# Patient Record
Sex: MALE | Race: WHITE
[De-identification: names, ages, dates, MRNs, and addresses within clinical notes are randomized per-mention and may not be internally consistent; named-entity substitution may affect disease eponyms.]

---

## 2017-03-22 ENCOUNTER — HOSPITAL ENCOUNTER (OUTPATIENT)
Dept: HOSPITAL 31 - C.ENDO | Age: 77
Discharge: HOME | End: 2017-03-22
Attending: INTERNAL MEDICINE
Payer: MEDICARE

## 2017-03-22 VITALS — TEMPERATURE: 97.4 F

## 2017-03-22 VITALS — BODY MASS INDEX: 30.7 KG/M2

## 2017-03-22 VITALS — SYSTOLIC BLOOD PRESSURE: 138 MMHG | HEART RATE: 97 BPM | RESPIRATION RATE: 13 BRPM | DIASTOLIC BLOOD PRESSURE: 79 MMHG

## 2017-03-22 VITALS — OXYGEN SATURATION: 100 %

## 2017-03-22 DIAGNOSIS — K64.8: ICD-10-CM

## 2017-03-22 DIAGNOSIS — K59.00: Primary | ICD-10-CM

## 2017-03-22 PROCEDURE — 45378 DIAGNOSTIC COLONOSCOPY: CPT

## 2017-03-22 PROCEDURE — 82948 REAGENT STRIP/BLOOD GLUCOSE: CPT

## 2017-10-18 ENCOUNTER — HOSPITAL ENCOUNTER (INPATIENT)
Dept: HOSPITAL 31 - C.ER | Age: 77
LOS: 6 days | Discharge: NURSING, REHAB OR LONG-TERM CARE | DRG: 377 | End: 2017-10-24
Attending: FAMILY MEDICINE | Admitting: FAMILY MEDICINE
Payer: MEDICARE

## 2017-10-18 VITALS — BODY MASS INDEX: 30.7 KG/M2

## 2017-10-18 DIAGNOSIS — I25.10: ICD-10-CM

## 2017-10-18 DIAGNOSIS — I50.9: ICD-10-CM

## 2017-10-18 DIAGNOSIS — D62: ICD-10-CM

## 2017-10-18 DIAGNOSIS — E11.9: ICD-10-CM

## 2017-10-18 DIAGNOSIS — K59.09: ICD-10-CM

## 2017-10-18 DIAGNOSIS — E78.5: ICD-10-CM

## 2017-10-18 DIAGNOSIS — Z87.891: ICD-10-CM

## 2017-10-18 DIAGNOSIS — G47.30: ICD-10-CM

## 2017-10-18 DIAGNOSIS — N40.0: ICD-10-CM

## 2017-10-18 DIAGNOSIS — I48.91: ICD-10-CM

## 2017-10-18 DIAGNOSIS — J18.9: ICD-10-CM

## 2017-10-18 DIAGNOSIS — K64.8: ICD-10-CM

## 2017-10-18 DIAGNOSIS — K55.21: Primary | ICD-10-CM

## 2017-10-18 DIAGNOSIS — M81.0: ICD-10-CM

## 2017-10-18 DIAGNOSIS — J44.0: ICD-10-CM

## 2017-10-18 DIAGNOSIS — I11.0: ICD-10-CM

## 2017-10-18 DIAGNOSIS — D12.3: ICD-10-CM

## 2017-10-18 DIAGNOSIS — K29.80: ICD-10-CM

## 2017-10-18 DIAGNOSIS — E78.00: ICD-10-CM

## 2017-10-18 DIAGNOSIS — F41.9: ICD-10-CM

## 2017-10-18 DIAGNOSIS — F31.9: ICD-10-CM

## 2017-10-18 DIAGNOSIS — K29.60: ICD-10-CM

## 2017-10-18 LAB
ALBUMIN/GLOB SERPL: 1 {RATIO} (ref 1–2.1)
ALP SERPL-CCNC: 49 U/L (ref 38–126)
ALT SERPL-CCNC: 78 U/L (ref 21–72)
APTT BLD: 27 SECONDS (ref 21–34)
AST SERPL-CCNC: 46 U/L (ref 17–59)
BASE EXCESS BLDV CALC-SCNC: 3.2 MMOL/L (ref 0–2)
BASOPHILS # BLD AUTO: 0 K/UL (ref 0–0.2)
BASOPHILS NFR BLD: 0.6 % (ref 0–2)
BILIRUB SERPL-MCNC: 0.7 MG/DL (ref 0.2–1.3)
BILIRUB UR-MCNC: NEGATIVE MG/DL
BUN SERPL-MCNC: 34 MG/DL (ref 9–20)
CALCIUM SERPL-MCNC: 8.4 MG/DL (ref 8.6–10.4)
CHLORIDE SERPL-SCNC: 104 MMOL/L (ref 98–107)
CO2 SERPL-SCNC: 24 MMOL/L (ref 22–30)
EOSINOPHIL # BLD AUTO: 0.1 K/UL (ref 0–0.7)
EOSINOPHIL NFR BLD: 1.8 % (ref 0–4)
ERYTHROCYTE [DISTWIDTH] IN BLOOD BY AUTOMATED COUNT: 15.7 % (ref 11.5–14.5)
GLOBULIN SER-MCNC: 2.8 GM/DL (ref 2.2–3.9)
GLUCOSE SERPL-MCNC: 88 MG/DL (ref 75–110)
GLUCOSE UR STRIP-MCNC: NORMAL MG/DL
HCT VFR BLD CALC: 22.2 % (ref 35–51)
INR PPP: 1.2
KETONES UR STRIP-MCNC: NEGATIVE MG/DL
LEUKOCYTE ESTERASE UR-ACNC: (no result) LEU/UL
LYMPHOCYTES # BLD AUTO: 1 K/UL (ref 1–4.3)
LYMPHOCYTES NFR BLD AUTO: 13.7 % (ref 20–40)
MCH RBC QN AUTO: 31.7 PG (ref 27–31)
MCHC RBC AUTO-ENTMCNC: 33 G/DL (ref 33–37)
MCV RBC AUTO: 96 FL (ref 80–94)
MONOCYTES # BLD: 0.4 K/UL (ref 0–0.8)
MONOCYTES NFR BLD: 5.3 % (ref 0–10)
NRBC BLD AUTO-RTO: 0.7 % (ref 0–2)
PCO2 BLDV: 42 MMHG (ref 40–60)
PH BLDV: 7.43 [PH] (ref 7.32–7.43)
PH UR STRIP: 5 [PH] (ref 5–8)
PLATELET # BLD: 115 K/UL (ref 130–400)
PMV BLD AUTO: 13.3 FL (ref 7.2–11.7)
POTASSIUM SERPL-SCNC: 4.4 MMOL/L (ref 3.6–5.2)
PROT SERPL-MCNC: 5.6 G/DL (ref 6.3–8.3)
PROT UR STRIP-MCNC: NEGATIVE MG/DL
RBC # UR STRIP: NEGATIVE /UL
RBC #/AREA URNS HPF: < 1 /HPF (ref 0–3)
SODIUM SERPL-SCNC: 137 MMOL/L (ref 132–148)
SP GR UR STRIP: 1.03 (ref 1–1.03)
UROBILINOGEN UR-MCNC: 2 MG/DL (ref 0.2–1)
WBC # BLD AUTO: 7.6 K/UL (ref 4.8–10.8)
WBC #/AREA URNS HPF: 3 /HPF (ref 0–5)

## 2017-10-18 PROCEDURE — 30233N1 TRANSFUSION OF NONAUTOLOGOUS RED BLOOD CELLS INTO PERIPHERAL VEIN, PERCUTANEOUS APPROACH: ICD-10-PCS | Performed by: FAMILY MEDICINE

## 2017-10-18 NOTE — CP.PCM.HP
<Nancy JohnsonJarad - Last Filed: 10/18/17 23:12>





History of Present Illness





- History of Present Illness


History of Present Illness: 


Patient is a 77 years old male with a past medical history of a fib, HTN, CAD, 

CHF, HLD, who presents to the ED from [facility] with complaints of 

weakness and a low hemoglobin. Patient was sent in by Dr. Claros for a 

hemoglobin of 7.3, which dropped from a hgb of 9.2 two days prior. Patient 

reports he has been feeling weak and lethargic for approximately 1 week. He 

says he cannot walk because he feels so weak. Patient also reports feeling dizzy

, has shortness of breath that worsens when laying down, abdominal pain for the 

last 3-4 weeks that worsens with constipation, chronic constipation and black 

stool. He reports he becomes constipated for days, takes milk of magnesia, then 

has diarrhea, and continues that cycles. He has not had a bowel movement in 2 

days. Patient reports he was recently treated for bilateral leg swelling with 

water pills about 1 month ago, and since then has lost approximately 40lbs. 

Patient reports he has been bedridden for the past few weeks due to inability 

to walk. Currently, the patient denies having chest pain, palpitations, cough, 

nausea, vomiting, fevers, chills, headaches, and leg pain.





PMD: Dr. Claros


PMHx: patient denies, but as per EMR- a fib, HTN, CAD, CHF, HLD, depression 

bipolar disorder, osteoporisis, COPD


SurgHx: denies


FamHx: Father-eye cancer, Sisters- Breast cancer


SocHx: Former tobacco user, quit 1 month ago ([id number]); former social drinker

, denies drug use; LIves are [facility].


Allergies: NKDA


Medications: See EMR for extensive list.





Present on Admission





- Present on Admission


Any Indicators Present on Admission: No





Review of Systems





- Constitutional


Constitutional: Fatigue, Lethargy, Weight Loss, Weakness.  absent: Chills, Fever

, Headache





- EENT


Eyes: absent: Change in Vision


Ears: Dizziness





- Cardiovascular


Cardiovascular: Dyspnea, Lightheadedness.  absent: Chest Pain, Irregular Heart 

Rhythm, Leg Edema, Palpitations, Rapid Heart Rate





- Respiratory


Respiratory: Dyspnea.  absent: Cough





- Gastrointestinal


Gastrointestinal: Abdominal Pain (mildline suprapubic tenderness), Change in 

Stool Character (Black stool), Constipation, Melena.  absent: Diarrhea, Nausea, 

Vomiting





- Genitourinary


Genitourinary: Urinary Frequency.  absent: Dysuria, Hematuria





- Neurological


Neurological: Dizziness, Weakness.  absent: Headaches





- Endocrine


Endocrine: Fatigue.  absent: Palpitations





- Hematologic/Lymphatic


Hematologic: absent: Easy Bleeding, Easy Bruising





Past Patient History





- Past Medical History & Family History


Past Medical History?: Yes





- Past Social History


Smoking Status: Never Smoked





- CARDIAC


Hx Cardia Arrhythmia: Yes (A FIB)


Hx Congestive Heart Failure: Yes


Hx Hypercholesterolemia: Yes


Hx Hypertension: Yes





- PULMONARY


Hx Chronic Obstructive Pulmonary Disease (COPD): Yes


Hx Sleep Apnea: Yes





- NEUROLOGICAL


HX Cerebrovascular Accident: No


Hx Transient Ischemic Attacks (TIA): No





- HEENT


Hx HEENT Problems: Yes (JEMIMA.)





- HEMATOLOGICAL/ONCOLOGICAL


Hx Blood Transfusions: No





- INTEGUMENTARY


Hx Dermatological Problems: Yes


Hx Cellulitis: Yes





- MUSCULOSKELETAL/RHEUMATOLOGICAL


Hx Osteoporosis: Yes





- GASTROINTESTINAL


Hx Gastrointestinal Disorders: Yes


Hx Hemorrhoids: Yes





- GENITOURINARY/GYNECOLOGICAL


Hx Genitourinary Disorders: Yes


Hx Prostate Problems: Yes (BENIGN PROSTATIC HYPERPLASIA)





- PSYCHIATRIC


Hx Anxiety: Yes


Hx Bipolar Disorder: Yes


Hx Depression: Yes


Hx Substance Use: No





- SURGICAL HISTORY


Hx Surgeries: No





- ANESTHESIA


Hx Anesthesia: No (IV SEDATION ONLY)


Hx Anesthesia Reactions: No


Hx Malignant Hyperthermia: No





Meds


Allergies/Adverse Reactions: 


 Allergies











Allergy/AdvReac Type Severity Reaction Status Date / Time


 


No Known Allergies Allergy   Verified 03/22/17 09:25














Physical Exam





- Head Exam


Head Exam: ATRAUMATIC, NORMAL INSPECTION





- Eye Exam


Eye Exam: EOMI, Normal appearance.  absent: Scleral icterus


Pupil Exam: PERRL





- ENT Exam


ENT Exam: Mucous Membranes Dry





- Respiratory Exam


Respiratory Exam: Decreased Breath Sounds, Rales (B/L lower lobes), NORMAL 

BREATHING PATTERN.  absent: Rhonchi, Wheezes, Respiratory Distress





- Cardiovascular Exam


Cardiovascular Exam: Tachycardia, Irregular Rhythm, +S1, +S2.  absent: 

Bradycardia





- GI/Abdominal Exam


GI & Abdominal Exam: Normal Bowel Sounds, Soft, Tenderness (midline, suprapubic

; "sharp" pain with palpation).  absent: Distended, Firm, Guarding, Mass





- Rectal Exam


Rectal Exam: absent: Black Stool, Bloody Stool, Hemorrhoids





- Extremities Exam


Extremities exam: Positive for: tenderness (B/L), pedal pulses present.  

Negative for: normal inspection (chronic skin color changes, mild edema)





- Neurological Exam


Neurological exam: Alert, Oriented x3





- Psychiatric Exam


Psychiatric exam: Normal Affect, Normal Mood





- Skin


Skin Exam: Dry, Intact, Warm





Results





- Vital Signs


Recent Vital Signs: 





 Last Vital Signs











Temp  97.8 F   10/18/17 17:48


 


Pulse  105 H  10/18/17 20:35


 


Resp  29 H  10/18/17 20:35


 


BP  101/66   10/18/17 20:35


 


Pulse Ox  100   10/18/17 21:19














- Labs


Result Diagrams: 


 10/18/17 18:49





 10/18/17 18:49


Labs: 





 Laboratory Results - last 24 hr











  10/18/17 10/18/17 10/18/17





  18:01 18:49 18:49


 


WBC    7.6


 


RBC    2.31 L


 


Hgb    7.3 L


 


Hct    22.2 L


 


MCV    96.0 H


 


MCH    31.7 H


 


MCHC    33.0


 


RDW    15.7 H


 


Plt Count    115 L


 


MPV    13.3 H


 


Neut % (Auto)    78.6 H


 


Lymph % (Auto)    13.7 L


 


Mono % (Auto)    5.3


 


Eos % (Auto)    1.8


 


Baso % (Auto)    0.6


 


Neut #    6.0


 


Lymph #    1.0


 


Mono #    0.4


 


Eos #    0.1


 


Baso #    0.0


 


Differential Comment    


 


pO2   


 


VBG pH   


 


VBG pCO2   


 


VBG HCO3   


 


VBG Total CO2   


 


VBG O2 Sat (Calc)   


 


VBG Base Excess   


 


VBG Potassium   


 


Glucose   


 


Lactate   


 


Sodium   137 


 


Potassium   4.4 


 


Chloride   104 


 


Carbon Dioxide   24 


 


Anion Gap   13 


 


BUN   34 H 


 


Creatinine   0.9 


 


Est GFR ( Amer)   > 60 


 


Est GFR (Non-Af Amer)   > 60 


 


POC Glucose (mg/dL)  119 H  


 


Random Glucose   88 


 


Calcium   8.4 L 


 


Total Bilirubin   0.7 


 


AST   46 


 


ALT   78 H 


 


Alkaline Phosphatase   49 


 


Total Protein   5.6 L 


 


Albumin   2.8 L 


 


Globulin   2.8 


 


Albumin/Globulin Ratio   1.0 


 


Venous Blood Potassium   


 


Urine Color   


 


Urine Clarity   


 


Urine pH   


 


Ur Specific Gravity   


 


Urine Protein   


 


Urine Glucose (UA)   


 


Urine Ketones   


 


Urine Blood   


 


Urine Nitrate   


 


Urine Bilirubin   


 


Urine Urobilinogen   


 


Ur Leukocyte Esterase   


 


Urine WBC (Auto)   


 


Urine RBC (Auto)   


 


Stool Occult Blood   


 


Blood Type   


 


Blood Type Confirm   


 


Antibody Screen   














  10/18/17 10/18/17 10/18/17





  18:49 18:49 18:55


 


WBC   


 


RBC   


 


Hgb   


 


Hct   


 


MCV   


 


MCH   


 


MCHC   


 


RDW   


 


Plt Count   


 


MPV   


 


Neut % (Auto)   


 


Lymph % (Auto)   


 


Mono % (Auto)   


 


Eos % (Auto)   


 


Baso % (Auto)   


 


Neut #   


 


Lymph #   


 


Mono #   


 


Eos #   


 


Baso #   


 


Differential Comment   


 


pO2    42


 


VBG pH    7.43


 


VBG pCO2    42


 


VBG HCO3    26.9


 


VBG Total CO2    29.2 H


 


VBG O2 Sat (Calc)    83.7 H


 


VBG Base Excess    3.2 H


 


VBG Potassium    5.9 H


 


Glucose    98


 


Lactate    2.6 H


 


Sodium    140.0


 


Potassium   


 


Chloride    113.0 H


 


Carbon Dioxide   


 


Anion Gap   


 


BUN   


 


Creatinine   


 


Est GFR ( Amer)   


 


Est GFR (Non-Af Amer)   


 


POC Glucose (mg/dL)   


 


Random Glucose   


 


Calcium   


 


Total Bilirubin   


 


AST   


 


ALT   


 


Alkaline Phosphatase   


 


Total Protein   


 


Albumin   


 


Globulin   


 


Albumin/Globulin Ratio   


 


Venous Blood Potassium    5.9 H


 


Urine Color  Yellow  


 


Urine Clarity  Clear  


 


Urine pH  5.0  


 


Ur Specific Gravity  1.026  


 


Urine Protein  Negative  


 


Urine Glucose (UA)  Normal  


 


Urine Ketones  Negative  


 


Urine Blood  Negative  


 


Urine Nitrate  Negative  


 


Urine Bilirubin  Negative  


 


Urine Urobilinogen  2.0  


 


Ur Leukocyte Esterase  Neg  


 


Urine WBC (Auto)  3  


 


Urine RBC (Auto)  < 1  


 


Stool Occult Blood   


 


Blood Type   A POSITIVE 


 


Blood Type Confirm   A POSITIVE 


 


Antibody Screen   Negative 














  10/18/17





  19:43


 


WBC 


 


RBC 


 


Hgb 


 


Hct 


 


MCV 


 


MCH 


 


MCHC 


 


RDW 


 


Plt Count 


 


MPV 


 


Neut % (Auto) 


 


Lymph % (Auto) 


 


Mono % (Auto) 


 


Eos % (Auto) 


 


Baso % (Auto) 


 


Neut # 


 


Lymph # 


 


Mono # 


 


Eos # 


 


Baso # 


 


Differential Comment 


 


pO2 


 


VBG pH 


 


VBG pCO2 


 


VBG HCO3 


 


VBG Total CO2 


 


VBG O2 Sat (Calc) 


 


VBG Base Excess 


 


VBG Potassium 


 


Glucose 


 


Lactate 


 


Sodium 


 


Potassium 


 


Chloride 


 


Carbon Dioxide 


 


Anion Gap 


 


BUN 


 


Creatinine 


 


Est GFR ( Amer) 


 


Est GFR (Non-Af Amer) 


 


POC Glucose (mg/dL) 


 


Random Glucose 


 


Calcium 


 


Total Bilirubin 


 


AST 


 


ALT 


 


Alkaline Phosphatase 


 


Total Protein 


 


Albumin 


 


Globulin 


 


Albumin/Globulin Ratio 


 


Venous Blood Potassium 


 


Urine Color 


 


Urine Clarity 


 


Urine pH 


 


Ur Specific Gravity 


 


Urine Protein 


 


Urine Glucose (UA) 


 


Urine Ketones 


 


Urine Blood 


 


Urine Nitrate 


 


Urine Bilirubin 


 


Urine Urobilinogen 


 


Ur Leukocyte Esterase 


 


Urine WBC (Auto) 


 


Urine RBC (Auto) 


 


Stool Occult Blood  Positive H


 


Blood Type 


 


Blood Type Confirm 


 


Antibody Screen 














Assessment & Plan


(1) Anemia due to GI blood loss


Assessment and Plan: 


Likely secondary to GI bleed


Hgb at admission 7.3


Stool Occult positive


HOLD Eliquis


Transfused 1 unit PRBC


Repeat CBC: f/u results


PT/PTT: f/u results


NPO


Gave 1 dose of Protonix 80mg PO.


Start Protonix 40mg PO BID tomorrow


GI consulted- Dr. Cho, help appreciated


Status: Acute   





(2) Abdominal pain


Assessment and Plan: 


Patient c/o midline suprapubic/abdominal pain, described as sharp with 

palpation and worsens with constipation.


Ordered Abdomen/Pelvis CT with PO/IV contrast: f/u results


Stool occult positive


Status: Acute   





(3) Atrial fibrillation


Assessment and Plan: 


Home medication: Eliquis 5mg PO BID


HOLD Eliquis due to anemia, hbg 7.3, GI bleed.


Monitor on telemetry.


Cardiology consulted- Dr. Monroy, help appreciated


EKG: Atrial fibrillation @90bpm


Echo: f/u results


Status: Acute   





(4) Shortness of breath


Assessment and Plan: 


Patient uses 2 pillows and elevates the back of bed at home to decrease 

symptoms of SOB.


CXR: f/u results


O2 2L via nasal cannula prn


Status: Acute   





(5) Constipation


Assessment and Plan: 


Continue home medications PRN:


* Milk of Magnesia 30mg PO HS PRN


* Miralax 17gm PO HS


* Lactulose 10gm PO daily


* DUlcolax 5mg PO HS


Status: Acute   





(6) Elevated alanine aminotransferase (ALT) level


Assessment and Plan: 


ALT 78


AST 46


Hepatitis panel: f/u results


Continue to monitor





Status: Acute   





(7) History of tobacco abuse


Assessment and Plan: 


Continue home medication: Nicoderm patch


Status: Acute   





(8) Hyperlipidemia


Assessment and Plan: 


Continue home medication: Fish oil 1 capsule PO BID





Status: Acute   





(9) History of bipolar disorder


Assessment and Plan: 


Continue home medications for bipolar disorder & depression


* Abilify 2mg daily


* Depakote 125mg PO TID


* Lexapro 10mg PO daily


Status: Acute   





(10) Prophylactic measure


Assessment and Plan: 


SCDs


Protonix 40mg PO BID


HOLD Eliquis due to GI bleed, hgb 7.3


Keep NPO


Fall precaution


PT/OT





Status: Acute   





<Dick Schmidt - Last Filed: 10/19/17 06:21>





Results





- Vital Signs


Recent Vital Signs: 





 Last Vital Signs











Temp  97.5 F L  10/19/17 04:25


 


Pulse  87   10/19/17 04:25


 


Resp  20   10/19/17 04:25


 


BP  111/68   10/19/17 04:25


 


Pulse Ox  97   10/19/17 04:25














- Labs


Result Diagrams: 


 10/18/17 18:49





 10/18/17 18:49


Labs: 





 Laboratory Results - last 24 hr











  10/18/17 10/18/17 10/18/17





  18:01 18:49 18:49


 


WBC    7.6


 


RBC    2.31 L


 


Hgb    7.3 L


 


Hct    22.2 L


 


MCV    96.0 H


 


MCH    31.7 H


 


MCHC    33.0


 


RDW    15.7 H


 


Plt Count    115 L


 


MPV    13.3 H


 


Neut % (Auto)    78.6 H


 


Lymph % (Auto)    13.7 L


 


Mono % (Auto)    5.3


 


Eos % (Auto)    1.8


 


Baso % (Auto)    0.6


 


Neut #    6.0


 


Lymph #    1.0


 


Mono #    0.4


 


Eos #    0.1


 


Baso #    0.0


 


Differential Comment    


 


PT   


 


INR   


 


APTT   


 


pO2   


 


VBG pH   


 


VBG pCO2   


 


VBG HCO3   


 


VBG Total CO2   


 


VBG O2 Sat (Calc)   


 


VBG Base Excess   


 


VBG Potassium   


 


Glucose   


 


Lactate   


 


Sodium   137 


 


Potassium   4.4 


 


Chloride   104 


 


Carbon Dioxide   24 


 


Anion Gap   13 


 


BUN   34 H 


 


Creatinine   0.9 


 


Est GFR ( Amer)   > 60 


 


Est GFR (Non-Af Amer)   > 60 


 


POC Glucose (mg/dL)  119 H  


 


Random Glucose   88 


 


Calcium   8.4 L 


 


Total Bilirubin   0.7 


 


AST   46 


 


ALT   78 H 


 


Alkaline Phosphatase   49 


 


Total Protein   5.6 L 


 


Albumin   2.8 L 


 


Globulin   2.8 


 


Albumin/Globulin Ratio   1.0 


 


Venous Blood Potassium   


 


Urine Color   


 


Urine Clarity   


 


Urine pH   


 


Ur Specific Gravity   


 


Urine Protein   


 


Urine Glucose (UA)   


 


Urine Ketones   


 


Urine Blood   


 


Urine Nitrate   


 


Urine Bilirubin   


 


Urine Urobilinogen   


 


Ur Leukocyte Esterase   


 


Urine WBC (Auto)   


 


Urine RBC (Auto)   


 


Stool Occult Blood   


 


Blood Type   


 


Blood Type Confirm   


 


Antibody Screen   














  10/18/17 10/18/17 10/18/17





  18:49 18:49 18:55


 


WBC   


 


RBC   


 


Hgb   


 


Hct   


 


MCV   


 


MCH   


 


MCHC   


 


RDW   


 


Plt Count   


 


MPV   


 


Neut % (Auto)   


 


Lymph % (Auto)   


 


Mono % (Auto)   


 


Eos % (Auto)   


 


Baso % (Auto)   


 


Neut #   


 


Lymph #   


 


Mono #   


 


Eos #   


 


Baso #   


 


Differential Comment   


 


PT   


 


INR   


 


APTT   


 


pO2    42


 


VBG pH    7.43


 


VBG pCO2    42


 


VBG HCO3    26.9


 


VBG Total CO2    29.2 H


 


VBG O2 Sat (Calc)    83.7 H


 


VBG Base Excess    3.2 H


 


VBG Potassium    5.9 H


 


Glucose    98


 


Lactate    2.6 H


 


Sodium    140.0


 


Potassium   


 


Chloride    113.0 H


 


Carbon Dioxide   


 


Anion Gap   


 


BUN   


 


Creatinine   


 


Est GFR ( Amer)   


 


Est GFR (Non-Af Amer)   


 


POC Glucose (mg/dL)   


 


Random Glucose   


 


Calcium   


 


Total Bilirubin   


 


AST   


 


ALT   


 


Alkaline Phosphatase   


 


Total Protein   


 


Albumin   


 


Globulin   


 


Albumin/Globulin Ratio   


 


Venous Blood Potassium    5.9 H


 


Urine Color  Yellow  


 


Urine Clarity  Clear  


 


Urine pH  5.0  


 


Ur Specific Gravity  1.026  


 


Urine Protein  Negative  


 


Urine Glucose (UA)  Normal  


 


Urine Ketones  Negative  


 


Urine Blood  Negative  


 


Urine Nitrate  Negative  


 


Urine Bilirubin  Negative  


 


Urine Urobilinogen  2.0  


 


Ur Leukocyte Esterase  Neg  


 


Urine WBC (Auto)  3  


 


Urine RBC (Auto)  < 1  


 


Stool Occult Blood   


 


Blood Type   A POSITIVE 


 


Blood Type Confirm   A POSITIVE 


 


Antibody Screen   Negative 














  10/18/17 10/18/17





  19:43 22:40


 


WBC  


 


RBC  


 


Hgb  


 


Hct  


 


MCV  


 


MCH  


 


MCHC  


 


RDW  


 


Plt Count  


 


MPV  


 


Neut % (Auto)  


 


Lymph % (Auto)  


 


Mono % (Auto)  


 


Eos % (Auto)  


 


Baso % (Auto)  


 


Neut #  


 


Lymph #  


 


Mono #  


 


Eos #  


 


Baso #  


 


Differential Comment  


 


PT   13.2 H


 


INR   1.2


 


APTT   27


 


pO2  


 


VBG pH  


 


VBG pCO2  


 


VBG HCO3  


 


VBG Total CO2  


 


VBG O2 Sat (Calc)  


 


VBG Base Excess  


 


VBG Potassium  


 


Glucose  


 


Lactate  


 


Sodium  


 


Potassium  


 


Chloride  


 


Carbon Dioxide  


 


Anion Gap  


 


BUN  


 


Creatinine  


 


Est GFR ( Amer)  


 


Est GFR (Non-Af Amer)  


 


POC Glucose (mg/dL)  


 


Random Glucose  


 


Calcium  


 


Total Bilirubin  


 


AST  


 


ALT  


 


Alkaline Phosphatase  


 


Total Protein  


 


Albumin  


 


Globulin  


 


Albumin/Globulin Ratio  


 


Venous Blood Potassium  


 


Urine Color  


 


Urine Clarity  


 


Urine pH  


 


Ur Specific Gravity  


 


Urine Protein  


 


Urine Glucose (UA)  


 


Urine Ketones  


 


Urine Blood  


 


Urine Nitrate  


 


Urine Bilirubin  


 


Urine Urobilinogen  


 


Ur Leukocyte Esterase  


 


Urine WBC (Auto)  


 


Urine RBC (Auto)  


 


Stool Occult Blood  Positive H 


 


Blood Type  


 


Blood Type Confirm  


 


Antibody Screen  














Assessment & Plan





- Date & Time


Date: 10/19/17 (I have seen and examined the patient.  I agree with the 

findings and plan of care as documented by Dr. Johnson.  Patient with GI 

bleed.  Consult to GI.  Protonix.  Monitor for changes in hemodynamic 

stability.  Symptomatic anemia.  Transfuse 1 unit of PRBC.  Recheck CBC.  Also 

with history of Atrial fib.  Hold anticoagulation. Monitor for acute changes.)


Time: 06:19





Attending/Attestation





- Attestation


I have personally seen and examined this patient.: Yes


I have fully participated in the care of the patient.: Yes


I have reviewed all pertinent clinical information: Yes

## 2017-10-18 NOTE — C.PDOC
History Of Present Illness


76 y/o male with PMHx of A-fib and DM sent from Nursing Home by Dr. Mccarthy with 

complaints of generalized weakness and lethargy. Patient had labs 2 days ago 

and hemoglobin was 9, today hemoglobin is 7.5 and he was sent for further 

evaluation. Patient reports to be sob when he lies flat but denies any chest or 

abdominal pain. He has chronic constipation but reports normal po intake. No 

other complaints at this time.  


Time Seen by Provider: 10/18/17 18:24


Chief Complaint (Nursing): Abnormal Labs


History Per: Patient


History/Exam Limitations: no limitations


Onset/Duration Of Symptoms: Days


Current Symptoms Are (Timing): Still Present





Past Medical History


Reviewed: Historical Data, Nursing Documentation, Vital Signs


Vital Signs: 


 Last Vital Signs











Temp  97.8 F   10/18/17 17:48


 


Pulse  105 H  10/18/17 20:35


 


Resp  29 H  10/18/17 20:35


 


BP  101/66   10/18/17 20:35


 


Pulse Ox  100   10/18/17 20:35














- Medical History


PMH: Anxiety, Bipolar Disorder, CAD, Cardia Arrhythmia (A FIB), CHF, COPD, 

Depression, HTN, Hypercholesterolemia, Osteoporosis, Sleep Apnea


Surgical History: No Surg Hx





- [facility] Procedures








INJECT/INFUSE NEC (03/28/15)








Family History: States: No Known Family Hx





- Social History


Hx Alcohol Use: No


Hx Substance Use: No





Review Of Systems


Constitutional: Negative for: Fever, Chills


Eyes: Negative for: Vision Change


Gastrointestinal: Negative for: Nausea, Vomiting


Neurological: Positive for: Weakness.  Negative for: Headache, Dizziness





Physical Exam





- Physical Exam


Appears: Non-toxic, No Acute Distress


Skin: Warm, Dry, Pale


Head: Atraumatic, Normacephalic


Eye(s): bilateral: Normal Inspection, EOMI


Oral Mucosa: Moist


Neck: Normal ROM, Supple


Chest: Symmetrical


Cardiovascular: Rhythm Irregular, Other (tachycardic)


Respiratory: Rales (right side basilar), No Rhonchi, No Wheezing


Gastrointestinal/Abdominal: Soft, No Tenderness, No Distention, No Guarding, No 

Rebound


Rectal: Other (black hard stool in rectal vault)


Extremity: No Deformity, No Swelling, Other (venous stasis bilat legs, +stasis 

pigmentation)


Extremity: Bilateral: Normal ROM


Pulses: Left Dorsalis Pedis: Normal, Right Dorsalis Pedis: Normal


Neurological/Psych: Oriented x3, Normal Motor, Normal Sensation





ED Course And Treatment





- Laboratory Results


Result Diagrams: 


 10/18/17 18:49





 10/18/17 18:49


Lab Interpretation: Abnormal (Hgb 7.3, BUN 34, Lactate 2.6, Stool positive for 

occult blood)


ECG: Interpreted By Me


ECG Rhythm: Atrial Fibrillation, Atrial Flutter


ECG Interpretation: Abnormal


Rate From EC


O2 Sat by Pulse Oximetry: 100 (RA)


Pulse Ox Interpretation: Normal





- Radiology


CXR: Interpreted by Me


CXR Interpretation: Yes: Other (right pleural effusion, elevated left 

hemidiaphragm)


Reevaluation Time: 21:17


Reassessment Condition: Unchanged





- Physician Consult Information


Physician Contacted: Dick Schmidt


Outcome Of Conversation: Patient to be admitted for GI bleeding with anemia. 

Evaluation by intensivist is requested. Case reviewed with Dr Parkinson





Disposition





- Disposition


Disposition: HOSPITALIZED


Disposition Time: 21:18


Condition: FAIR





- POA


Present On Arrival: None





- Clinical Impression


Clinical Impression: 


 Anemia due to GI blood loss








- Scribe Statement


The provider has reviewed the documentation as recorded by the Rivkaibduncan Álvarez





All medical record entries made by the Rivkaibduncan were at my direction and 

personally dictated by me. I have reviewed the chart and agree that the record 

accurately reflects my personal performance of the history, physical exam, 

medical decision making, and the department course for this patient. I have 

also personally directed, reviewed, and agree with the discharge instructions 

and disposition.

## 2017-10-19 LAB
ALBUMIN/GLOB SERPL: 1 {RATIO} (ref 1–2.1)
ALP SERPL-CCNC: 43 U/L (ref 38–126)
ALT SERPL-CCNC: 69 U/L (ref 21–72)
APTT BLD: 26 SECONDS (ref 21–34)
AST SERPL-CCNC: 36 U/L (ref 17–59)
BASOPHILS # BLD AUTO: 0 K/UL (ref 0–0.2)
BASOPHILS NFR BLD: 0.6 % (ref 0–2)
BILIRUB SERPL-MCNC: 0.8 MG/DL (ref 0.2–1.3)
BUN SERPL-MCNC: 30 MG/DL (ref 9–20)
CALCIUM SERPL-MCNC: 7.9 MG/DL (ref 8.6–10.4)
CHLORIDE SERPL-SCNC: 107 MMOL/L (ref 98–107)
CO2 SERPL-SCNC: 26 MMOL/L (ref 22–30)
EOSINOPHIL # BLD AUTO: 0.1 K/UL (ref 0–0.7)
EOSINOPHIL NFR BLD: 2.9 % (ref 0–4)
ERYTHROCYTE [DISTWIDTH] IN BLOOD BY AUTOMATED COUNT: 17.5 % (ref 11.5–14.5)
GLOBULIN SER-MCNC: 2.6 GM/DL (ref 2.2–3.9)
GLUCOSE SERPL-MCNC: 77 MG/DL (ref 75–110)
HCT VFR BLD CALC: 21.9 % (ref 35–51)
INR PPP: 1.2
LYMPHOCYTES # BLD AUTO: 0.8 K/UL (ref 1–4.3)
LYMPHOCYTES NFR BLD AUTO: 15.9 % (ref 20–40)
MCH RBC QN AUTO: 31 PG (ref 27–31)
MCHC RBC AUTO-ENTMCNC: 33.2 G/DL (ref 33–37)
MCV RBC AUTO: 93.6 FL (ref 80–94)
MONOCYTES # BLD: 0.4 K/UL (ref 0–0.8)
MONOCYTES NFR BLD: 7.8 % (ref 0–10)
NRBC BLD AUTO-RTO: 0.3 % (ref 0–2)
PLATELET # BLD: 93 K/UL (ref 130–400)
PMV BLD AUTO: 13.3 FL (ref 7.2–11.7)
POTASSIUM SERPL-SCNC: 4.3 MMOL/L (ref 3.6–5.2)
PROT SERPL-MCNC: 5.2 G/DL (ref 6.3–8.3)
SODIUM SERPL-SCNC: 139 MMOL/L (ref 132–148)
WBC # BLD AUTO: 4.9 K/UL (ref 4.8–10.8)

## 2017-10-19 RX ADMIN — OMEGA-3-ACID ETHYL ESTERS SCH: 900 CAPSULE, LIQUID FILLED ORAL at 10:23

## 2017-10-19 RX ADMIN — OMEGA-3-ACID ETHYL ESTERS SCH GM: 900 CAPSULE, LIQUID FILLED ORAL at 18:27

## 2017-10-19 RX ADMIN — DIVALPROEX SODIUM SCH MG: 125 TABLET, DELAYED RELEASE ORAL at 13:45

## 2017-10-19 RX ADMIN — Medication SCH: at 22:23

## 2017-10-19 RX ADMIN — DIVALPROEX SODIUM SCH: 125 TABLET, DELAYED RELEASE ORAL at 10:23

## 2017-10-19 RX ADMIN — PANTOPRAZOLE SODIUM SCH MG: 40 TABLET, DELAYED RELEASE ORAL at 18:28

## 2017-10-19 RX ADMIN — PANTOPRAZOLE SODIUM SCH: 40 TABLET, DELAYED RELEASE ORAL at 10:23

## 2017-10-19 RX ADMIN — DIVALPROEX SODIUM SCH MG: 125 TABLET, DELAYED RELEASE ORAL at 18:27

## 2017-10-19 NOTE — CP.PCM.CON
<Radha Douglas - Last Filed: 10/19/17 14:28>





History of Present Illness





- History of Present Illness


History of Present Illness: 


Gastroenterology Fellow/PGY5 Consult Note





77 year old male with history of Atrial fibrillation on Eliquis, Hypertension, 

Hyperlipidemia, CAD, Diabetes, Depression, BiPolar disorder, osteoporosis, and 

constipation presenting from nursing facility due to anemia. Patient notes 

having black diarrhea five days ago for two days. He denies bowel movement for 

the last 3-4 days. Baseline bowel habit every 3-4 days associated with 

straining and usually dark brown that he attributes to prune juice. He notes 

vague bilateral lower quadrant constant abdominal pain for the last month, pain 

scale 5/10 that is not relieved by bowel habit. Associated loss of appetite for 

the last four days. Denies nausea, vomiting, hematemesis, acid reflux, bloating

, indigestion, hematochezia, or unintentional weight loss. Prior colonoscopy 3/

2017 showed poor prep.





Family- Father- eye cancer, sister- breast cancer


Social -50 pack year (quit 10 years), former social drinker, denies illicit 

drug use


Surgery- none








Review of Systems





- Review of Systems


Review of Systems: 


12-point review of systems negative except for as above








Past Patient History





- Past Medical History & Family History


Past Medical History?: Yes





- Past Social History


Smoking Status: Never Smoked





- CARDIAC


Hx Cardia Arrhythmia: Yes (A FIB)


Hx Congestive Heart Failure: Yes


Hx Hypercholesterolemia: Yes


Hx Hypertension: Yes





- PULMONARY


Hx Chronic Obstructive Pulmonary Disease (COPD): Yes


Hx Sleep Apnea: Yes





- NEUROLOGICAL


HX Cerebrovascular Accident: No


Hx Transient Ischemic Attacks (TIA): No





- HEENT


Hx HEENT Problems: Yes (JEMIMA.)





- RENAL


Hx Chronic Kidney Disease: No





- ENDOCRINE/METABOLIC


Hx Endocrine Disorders: No





- HEMATOLOGICAL/ONCOLOGICAL


Hx Blood Transfusions: No





- INTEGUMENTARY


Hx Dermatological Problems: Yes


Hx Cellulitis: Yes





- MUSCULOSKELETAL/RHEUMATOLOGICAL


Hx Osteoporosis: Yes





- GASTROINTESTINAL


Hx Gastrointestinal Disorders: Yes


Hx Hemorrhoids: Yes





- GENITOURINARY/GYNECOLOGICAL


Hx Genitourinary Disorders: Yes


Hx Prostate Problems: Yes (BENIGN PROSTATIC HYPERPLASIA)





- PSYCHIATRIC


Hx Anxiety: Yes


Hx Bipolar Disorder: Yes


Hx Depression: Yes


Hx Substance Use: No





- SURGICAL HISTORY


Hx Surgeries: No





- ANESTHESIA


Hx Anesthesia: No (IV SEDATION ONLY)


Hx Anesthesia Reactions: No


Hx Malignant Hyperthermia: No





Meds


Allergies/Adverse Reactions: 


 Allergies











Allergy/AdvReac Type Severity Reaction Status Date / Time


 


No Known Allergies Allergy   Verified 03/22/17 09:25














- Medications


Medications: 


 Current Medications





Aripiprazole (Abilify)  1 mg PO DAILY [facility]


   Last Admin: 10/19/17 11:23 Dose:  Not Given


Bisacodyl (Dulcolax)  5 mg PO HS [facility]


Divalproex Sodium (Depakote Dr Tab)  125 mg PO TID [facility]


   Last Admin: 10/19/17 10:23 Dose:  Not Given


Escitalopram Oxalate (Lexapro)  10 mg PO DAILY [facility]


   Last Admin: 10/19/17 10:23 Dose:  Not Given


Sodium Chloride (Sodium Chloride 0.9%)  1,000 mls @ 60 mls/hr IV .[id number] [facility]


   Last Admin: 10/19/17 11:28 Dose:  60 mls/hr


Magnesium Hydroxide (Milk Of Magnesia)  30 ml PO HS PRN


   PRN Reason: Constipation


Nicotine (Nicoderm Cq)  1 patch TD DAILY [facility]


   Last Admin: 10/19/17 10:23 Dose:  Not Given


Omega-3-Acid Ethyl Esters (Lovaza)  1,000 gm PO BID [facility]


   Last Admin: 10/19/17 10:23 Dose:  Not Given


Pantoprazole Sodium (Protonix Ec Tab)  40 mg PO BID [facility]


   Last Admin: 10/19/17 10:23 Dose:  Not Given











Physical Exam





- Constitutional


Appears: Non-toxic, No Acute Distress





- Head Exam


Head Exam: ATRAUMATIC, NORMOCEPHALIC





- Eye Exam


Eye Exam: EOMI, PERRL.  absent: Scleral icterus


Pupil Exam: PERRL.  absent: Miosis, Mydriatic





- ENT Exam


ENT Exam: Mucous Membranes Moist, Normal Oropharynx





- Neck Exam


Neck exam: Positive for: Full Rom, Normal Inspection





- Respiratory Exam


Respiratory Exam: Clear to Auscultation Bilateral.  absent: Rales, Rhonchi, 

Wheezes





- Cardiovascular Exam


Cardiovascular Exam: RRR, +S1, +S2.  absent: Gallop, Rubs





- GI/Abdominal Exam


GI & Abdominal Exam: Normal Bowel Sounds, Soft, Tenderness.  absent: Distended, 

Firm, Guarding, Organomegaly, Rebound, Rigid


Additional comments: 


diffuse discomfort to palpation








- Rectal Exam


Rectal Exam: Black Stool.  absent: Fecal Impaction





- Extremities Exam


Extremities exam: Positive for: normal inspection, pedal edema





- Neurological Exam


Neurological exam: Oriented x3





- Psychiatric Exam


Psychiatric exam: Normal Affect, Normal Mood





- Skin


Skin Exam: Dry, Intact, Normal Color, Warm





Results





- Vital Signs


Recent Vital Signs: 


 Last Vital Signs











Temp  98.6 F   10/19/17 08:28


 


Pulse  102 H  10/19/17 08:28


 


Resp  20   10/19/17 08:28


 


BP  101/65   10/19/17 08:28


 


Pulse Ox  100   10/19/17 08:28














- Labs


Result Diagrams: 


 10/19/17 06:45





 10/19/17 06:45


Labs: 


 Laboratory Results - last 24 hr











  10/18/17 10/18/17 10/18/17





  18:01 18:49 18:49


 


WBC    7.6


 


RBC    2.31 L


 


Hgb    7.3 L


 


Hct    22.2 L


 


MCV    96.0 H


 


MCH    31.7 H


 


MCHC    33.0


 


RDW    15.7 H


 


Plt Count    115 L


 


MPV    13.3 H


 


Neut % (Auto)    78.6 H


 


Lymph % (Auto)    13.7 L


 


Mono % (Auto)    5.3


 


Eos % (Auto)    1.8


 


Baso % (Auto)    0.6


 


Neut #    6.0


 


Lymph #    1.0


 


Mono #    0.4


 


Eos #    0.1


 


Baso #    0.0


 


Differential Comment    


 


PT   


 


INR   


 


APTT   


 


pO2   


 


VBG pH   


 


VBG pCO2   


 


VBG HCO3   


 


VBG Total CO2   


 


VBG O2 Sat (Calc)   


 


VBG Base Excess   


 


VBG Potassium   


 


Glucose   


 


Lactate   


 


Sodium   137 


 


Potassium   4.4 


 


Chloride   104 


 


Carbon Dioxide   24 


 


Anion Gap   13 


 


BUN   34 H 


 


Creatinine   0.9 


 


Est GFR ( Amer)   > 60 


 


Est GFR (Non-Af Amer)   > 60 


 


POC Glucose (mg/dL)  119 H  


 


Random Glucose   88 


 


Hemoglobin A1c   


 


Calcium   8.4 L 


 


Total Bilirubin   0.7 


 


AST   46 


 


ALT   78 H 


 


Alkaline Phosphatase   49 


 


Total Protein   5.6 L 


 


Albumin   2.8 L 


 


Globulin   2.8 


 


Albumin/Globulin Ratio   1.0 


 


Venous Blood Potassium   


 


Urine Color   


 


Urine Clarity   


 


Urine pH   


 


Ur Specific Gravity   


 


Urine Protein   


 


Urine Glucose (UA)   


 


Urine Ketones   


 


Urine Blood   


 


Urine Nitrate   


 


Urine Bilirubin   


 


Urine Urobilinogen   


 


Ur Leukocyte Esterase   


 


Urine WBC (Auto)   


 


Urine RBC (Auto)   


 


Stool Occult Blood   


 


Hepatitis A IgM Ab   


 


Hep Bs Antigen   


 


Hep B Core IgM Ab   


 


Hepatitis C Antibody   


 


Blood Type   


 


Blood Type Confirm   


 


Antibody Screen   














  10/18/17 10/18/17 10/18/17





  18:49 18:49 18:55


 


WBC   


 


RBC   


 


Hgb   


 


Hct   


 


MCV   


 


MCH   


 


MCHC   


 


RDW   


 


Plt Count   


 


MPV   


 


Neut % (Auto)   


 


Lymph % (Auto)   


 


Mono % (Auto)   


 


Eos % (Auto)   


 


Baso % (Auto)   


 


Neut #   


 


Lymph #   


 


Mono #   


 


Eos #   


 


Baso #   


 


Differential Comment   


 


PT   


 


INR   


 


APTT   


 


pO2    42


 


VBG pH    7.43


 


VBG pCO2    42


 


VBG HCO3    26.9


 


VBG Total CO2    29.2 H


 


VBG O2 Sat (Calc)    83.7 H


 


VBG Base Excess    3.2 H


 


VBG Potassium    5.9 H


 


Glucose    98


 


Lactate    2.6 H


 


Sodium    140.0


 


Potassium   


 


Chloride    113.0 H


 


Carbon Dioxide   


 


Anion Gap   


 


BUN   


 


Creatinine   


 


Est GFR ( Amer)   


 


Est GFR (Non-Af Amer)   


 


POC Glucose (mg/dL)   


 


Random Glucose   


 


Hemoglobin A1c   


 


Calcium   


 


Total Bilirubin   


 


AST   


 


ALT   


 


Alkaline Phosphatase   


 


Total Protein   


 


Albumin   


 


Globulin   


 


Albumin/Globulin Ratio   


 


Venous Blood Potassium    5.9 H


 


Urine Color  Yellow  


 


Urine Clarity  Clear  


 


Urine pH  5.0  


 


Ur Specific Gravity  1.026  


 


Urine Protein  Negative  


 


Urine Glucose (UA)  Normal  


 


Urine Ketones  Negative  


 


Urine Blood  Negative  


 


Urine Nitrate  Negative  


 


Urine Bilirubin  Negative  


 


Urine Urobilinogen  2.0  


 


Ur Leukocyte Esterase  Neg  


 


Urine WBC (Auto)  3  


 


Urine RBC (Auto)  < 1  


 


Stool Occult Blood   


 


Hepatitis A IgM Ab   


 


Hep Bs Antigen   


 


Hep B Core IgM Ab   


 


Hepatitis C Antibody   


 


Blood Type   A POSITIVE 


 


Blood Type Confirm   A POSITIVE 


 


Antibody Screen   Negative 














  10/18/17 10/18/17 10/19/17





  19:43 22:40 06:45


 


WBC    4.9


 


RBC    2.34 L


 


Hgb    7.3 L


 


Hct    21.9 L


 


MCV    93.6  D


 


MCH    31.0


 


MCHC    33.2


 


RDW    17.5 H


 


Plt Count    93 L D


 


MPV    13.3 H


 


Neut % (Auto)    72.8


 


Lymph % (Auto)    15.9 L


 


Mono % (Auto)    7.8


 


Eos % (Auto)    2.9


 


Baso % (Auto)    0.6


 


Neut #    3.6


 


Lymph #    0.8 L


 


Mono #    0.4


 


Eos #    0.1


 


Baso #    0.0


 


Differential Comment   


 


PT   13.2 H 


 


INR   1.2 


 


APTT   27 


 


pO2   


 


VBG pH   


 


VBG pCO2   


 


VBG HCO3   


 


VBG Total CO2   


 


VBG O2 Sat (Calc)   


 


VBG Base Excess   


 


VBG Potassium   


 


Glucose   


 


Lactate   


 


Sodium   


 


Potassium   


 


Chloride   


 


Carbon Dioxide   


 


Anion Gap   


 


BUN   


 


Creatinine   


 


Est GFR ( Amer)   


 


Est GFR (Non-Af Amer)   


 


POC Glucose (mg/dL)   


 


Random Glucose   


 


Hemoglobin A1c   


 


Calcium   


 


Total Bilirubin   


 


AST   


 


ALT   


 


Alkaline Phosphatase   


 


Total Protein   


 


Albumin   


 


Globulin   


 


Albumin/Globulin Ratio   


 


Venous Blood Potassium   


 


Urine Color   


 


Urine Clarity   


 


Urine pH   


 


Ur Specific Gravity   


 


Urine Protein   


 


Urine Glucose (UA)   


 


Urine Ketones   


 


Urine Blood   


 


Urine Nitrate   


 


Urine Bilirubin   


 


Urine Urobilinogen   


 


Ur Leukocyte Esterase   


 


Urine WBC (Auto)   


 


Urine RBC (Auto)   


 


Stool Occult Blood  Positive H  


 


Hepatitis A IgM Ab   


 


Hep Bs Antigen   


 


Hep B Core IgM Ab   


 


Hepatitis C Antibody   


 


Blood Type   


 


Blood Type Confirm   


 


Antibody Screen   














  10/19/17 10/19/17 10/19/17





  06:45 06:45 06:45


 


WBC   


 


RBC   


 


Hgb   


 


Hct   


 


MCV   


 


MCH   


 


MCHC   


 


RDW   


 


Plt Count   


 


MPV   


 


Neut % (Auto)   


 


Lymph % (Auto)   


 


Mono % (Auto)   


 


Eos % (Auto)   


 


Baso % (Auto)   


 


Neut #   


 


Lymph #   


 


Mono #   


 


Eos #   


 


Baso #   


 


Differential Comment   


 


PT  13.1 H  


 


INR  1.2  


 


APTT  26  


 


pO2   


 


VBG pH   


 


VBG pCO2   


 


VBG HCO3   


 


VBG Total CO2   


 


VBG O2 Sat (Calc)   


 


VBG Base Excess   


 


VBG Potassium   


 


Glucose   


 


Lactate   


 


Sodium   139 


 


Potassium   4.3 


 


Chloride   107 


 


Carbon Dioxide   26 


 


Anion Gap   10 


 


BUN   30 H 


 


Creatinine   0.9 


 


Est GFR ( Amer)   > 60 


 


Est GFR (Non-Af Amer)   > 60 


 


POC Glucose (mg/dL)   


 


Random Glucose   77 


 


Hemoglobin A1c    5.2


 


Calcium   7.9 L 


 


Total Bilirubin   0.8 


 


AST   36 


 


ALT   69 


 


Alkaline Phosphatase   43 


 


Total Protein   5.2 L 


 


Albumin   2.6 L 


 


Globulin   2.6 


 


Albumin/Globulin Ratio   1.0 


 


Venous Blood Potassium   


 


Urine Color   


 


Urine Clarity   


 


Urine pH   


 


Ur Specific Gravity   


 


Urine Protein   


 


Urine Glucose (UA)   


 


Urine Ketones   


 


Urine Blood   


 


Urine Nitrate   


 


Urine Bilirubin   


 


Urine Urobilinogen   


 


Ur Leukocyte Esterase   


 


Urine WBC (Auto)   


 


Urine RBC (Auto)   


 


Stool Occult Blood   


 


Hepatitis A IgM Ab   


 


Hep Bs Antigen   


 


Hep B Core IgM Ab   


 


Hepatitis C Antibody   


 


Blood Type   


 


Blood Type Confirm   


 


Antibody Screen   














  10/19/17





  06:45


 


WBC 


 


RBC 


 


Hgb 


 


Hct 


 


MCV 


 


MCH 


 


MCHC 


 


RDW 


 


Plt Count 


 


MPV 


 


Neut % (Auto) 


 


Lymph % (Auto) 


 


Mono % (Auto) 


 


Eos % (Auto) 


 


Baso % (Auto) 


 


Neut # 


 


Lymph # 


 


Mono # 


 


Eos # 


 


Baso # 


 


Differential Comment 


 


PT 


 


INR 


 


APTT 


 


pO2 


 


VBG pH 


 


VBG pCO2 


 


VBG HCO3 


 


VBG Total CO2 


 


VBG O2 Sat (Calc) 


 


VBG Base Excess 


 


VBG Potassium 


 


Glucose 


 


Lactate 


 


Sodium 


 


Potassium 


 


Chloride 


 


Carbon Dioxide 


 


Anion Gap 


 


BUN 


 


Creatinine 


 


Est GFR ( Amer) 


 


Est GFR (Non-Af Amer) 


 


POC Glucose (mg/dL) 


 


Random Glucose 


 


Hemoglobin A1c 


 


Calcium 


 


Total Bilirubin 


 


AST 


 


ALT 


 


Alkaline Phosphatase 


 


Total Protein 


 


Albumin 


 


Globulin 


 


Albumin/Globulin Ratio 


 


Venous Blood Potassium 


 


Urine Color 


 


Urine Clarity 


 


Urine pH 


 


Ur Specific Gravity 


 


Urine Protein 


 


Urine Glucose (UA) 


 


Urine Ketones 


 


Urine Blood 


 


Urine Nitrate 


 


Urine Bilirubin 


 


Urine Urobilinogen 


 


Ur Leukocyte Esterase 


 


Urine WBC (Auto) 


 


Urine RBC (Auto) 


 


Stool Occult Blood 


 


Hepatitis A IgM Ab  Negative


 


Hep Bs Antigen  Negative


 


Hep B Core IgM Ab  Negative


 


Hepatitis C Antibody  Negative


 


Blood Type 


 


Blood Type Confirm 


 


Antibody Screen 














Assessment & Plan





- Assessment and Plan (Free Text)


Assessment: 


77 year old male with history of Atrial fibrillation on Eliquis, Hypertension, 

Hyperlipidemia, CAD, Diabetes, Depression, BiPolar disorder, osteoporosis, and 

constipation presenting from nursing facility due to black stools and anemia. 

Active treatment of acute anemia with black stools concerning for upper GI 

bleed.  Prior colonoscopy March 2017 showed poor prep and incomplete exam to 

sigmoid colon.





Plan: 





>receiving 2nd unit pRBC


>goal Hb>8


>rectal exam- black stool (not tarry)


>last dose of Eliquis- 10/15/17


>CT A/P showed no acute pathology


>hemodynamically stable


>PPI BID


>clear liquid diet, NPO after midnight


>EGD Friday


>will benefit from eventual repeat colonoscopy given previous poor prep


>further recommendations after EGD tomorrow








<Neil Sandhu - Last Filed: 10/19/17 15:16>





Meds





- Medications


Medications: 


 Current Medications





Aripiprazole (Abilify)  1 mg PO DAILY [facility]


   Last Admin: 10/19/17 11:23 Dose:  Not Given


Bisacodyl (Dulcolax)  5 mg PO HS [facility]


Divalproex Sodium (Depakote Dr Tab)  125 mg PO TID [facility]


   Last Admin: 10/19/17 13:45 Dose:  125 mg


Escitalopram Oxalate (Lexapro)  10 mg PO DAILY [facility]


   Last Admin: 10/19/17 10:23 Dose:  Not Given


Sodium Chloride (Sodium Chloride 0.9%)  1,000 mls @ 60 mls/hr IV .[id number] [facility]


   Last Admin: 10/19/17 11:28 Dose:  60 mls/hr


Magnesium Hydroxide (Milk Of Magnesia)  30 ml PO HS PRN


   PRN Reason: Constipation


Nicotine (Nicoderm Cq)  1 patch TD DAILY [facility]


   Last Admin: 10/19/17 10:30 Dose:  1 patch


Omega-3-Acid Ethyl Esters (Lovaza)  1,000 gm PO BID [facility]


   Last Admin: 10/19/17 10:23 Dose:  Not Given


Pantoprazole Sodium (Protonix Ec Tab)  40 mg PO BID [facility]


   Last Admin: 10/19/17 10:23 Dose:  Not Given











Results





- Vital Signs


Recent Vital Signs: 


 Last Vital Signs











Temp  97.3 F L  10/19/17 15:03


 


Pulse  80   10/19/17 15:03


 


Resp  29 H  10/19/17 15:03


 


BP  107/57 L  10/19/17 15:03


 


Pulse Ox  100   10/19/17 08:28














- Labs


Result Diagrams: 


 10/19/17 06:45





 10/19/17 06:45


Labs: 


 Laboratory Results - last 24 hr











  10/18/17 10/18/17 10/18/17





  18:01 18:49 18:49


 


WBC    7.6


 


RBC    2.31 L


 


Hgb    7.3 L


 


Hct    22.2 L


 


MCV    96.0 H


 


MCH    31.7 H


 


MCHC    33.0


 


RDW    15.7 H


 


Plt Count    115 L


 


MPV    13.3 H


 


Neut % (Auto)    78.6 H


 


Lymph % (Auto)    13.7 L


 


Mono % (Auto)    5.3


 


Eos % (Auto)    1.8


 


Baso % (Auto)    0.6


 


Neut #    6.0


 


Lymph #    1.0


 


Mono #    0.4


 


Eos #    0.1


 


Baso #    0.0


 


Differential Comment    


 


PT   


 


INR   


 


APTT   


 


pO2   


 


VBG pH   


 


VBG pCO2   


 


VBG HCO3   


 


VBG Total CO2   


 


VBG O2 Sat (Calc)   


 


VBG Base Excess   


 


VBG Potassium   


 


Glucose   


 


Lactate   


 


Sodium   137 


 


Potassium   4.4 


 


Chloride   104 


 


Carbon Dioxide   24 


 


Anion Gap   13 


 


BUN   34 H 


 


Creatinine   0.9 


 


Est GFR ( Amer)   > 60 


 


Est GFR (Non-Af Amer)   > 60 


 


POC Glucose (mg/dL)  119 H  


 


Random Glucose   88 


 


Hemoglobin A1c   


 


Calcium   8.4 L 


 


Total Bilirubin   0.7 


 


AST   46 


 


ALT   78 H 


 


Alkaline Phosphatase   49 


 


Total Protein   5.6 L 


 


Albumin   2.8 L 


 


Globulin   2.8 


 


Albumin/Globulin Ratio   1.0 


 


Venous Blood Potassium   


 


Urine Color   


 


Urine Clarity   


 


Urine pH   


 


Ur Specific Gravity   


 


Urine Protein   


 


Urine Glucose (UA)   


 


Urine Ketones   


 


Urine Blood   


 


Urine Nitrate   


 


Urine Bilirubin   


 


Urine Urobilinogen   


 


Ur Leukocyte Esterase   


 


Urine WBC (Auto)   


 


Urine RBC (Auto)   


 


Stool Occult Blood   


 


Hepatitis A IgM Ab   


 


Hep Bs Antigen   


 


Hep B Core IgM Ab   


 


Hepatitis C Antibody   


 


Blood Type   


 


Blood Type Confirm   


 


Antibody Screen   














  10/18/17 10/18/17 10/18/17





  18:49 18:49 18:55


 


WBC   


 


RBC   


 


Hgb   


 


Hct   


 


MCV   


 


MCH   


 


MCHC   


 


RDW   


 


Plt Count   


 


MPV   


 


Neut % (Auto)   


 


Lymph % (Auto)   


 


Mono % (Auto)   


 


Eos % (Auto)   


 


Baso % (Auto)   


 


Neut #   


 


Lymph #   


 


Mono #   


 


Eos #   


 


Baso #   


 


Differential Comment   


 


PT   


 


INR   


 


APTT   


 


pO2    42


 


VBG pH    7.43


 


VBG pCO2    42


 


VBG HCO3    26.9


 


VBG Total CO2    29.2 H


 


VBG O2 Sat (Calc)    83.7 H


 


VBG Base Excess    3.2 H


 


VBG Potassium    5.9 H


 


Glucose    98


 


Lactate    2.6 H


 


Sodium    140.0


 


Potassium   


 


Chloride    113.0 H


 


Carbon Dioxide   


 


Anion Gap   


 


BUN   


 


Creatinine   


 


Est GFR ( Amer)   


 


Est GFR (Non-Af Amer)   


 


POC Glucose (mg/dL)   


 


Random Glucose   


 


Hemoglobin A1c   


 


Calcium   


 


Total Bilirubin   


 


AST   


 


ALT   


 


Alkaline Phosphatase   


 


Total Protein   


 


Albumin   


 


Globulin   


 


Albumin/Globulin Ratio   


 


Venous Blood Potassium    5.9 H


 


Urine Color  Yellow  


 


Urine Clarity  Clear  


 


Urine pH  5.0  


 


Ur Specific Gravity  1.026  


 


Urine Protein  Negative  


 


Urine Glucose (UA)  Normal  


 


Urine Ketones  Negative  


 


Urine Blood  Negative  


 


Urine Nitrate  Negative  


 


Urine Bilirubin  Negative  


 


Urine Urobilinogen  2.0  


 


Ur Leukocyte Esterase  Neg  


 


Urine WBC (Auto)  3  


 


Urine RBC (Auto)  < 1  


 


Stool Occult Blood   


 


Hepatitis A IgM Ab   


 


Hep Bs Antigen   


 


Hep B Core IgM Ab   


 


Hepatitis C Antibody   


 


Blood Type   A POSITIVE 


 


Blood Type Confirm   A POSITIVE 


 


Antibody Screen   Negative 














  10/18/17 10/18/17 10/19/17





  19:43 22:40 06:45


 


WBC    4.9


 


RBC    2.34 L


 


Hgb    7.3 L


 


Hct    21.9 L


 


MCV    93.6  D


 


MCH    31.0


 


MCHC    33.2


 


RDW    17.5 H


 


Plt Count    93 L D


 


MPV    13.3 H


 


Neut % (Auto)    72.8


 


Lymph % (Auto)    15.9 L


 


Mono % (Auto)    7.8


 


Eos % (Auto)    2.9


 


Baso % (Auto)    0.6


 


Neut #    3.6


 


Lymph #    0.8 L


 


Mono #    0.4


 


Eos #    0.1


 


Baso #    0.0


 


Differential Comment   


 


PT   13.2 H 


 


INR   1.2 


 


APTT   27 


 


pO2   


 


VBG pH   


 


VBG pCO2   


 


VBG HCO3   


 


VBG Total CO2   


 


VBG O2 Sat (Calc)   


 


VBG Base Excess   


 


VBG Potassium   


 


Glucose   


 


Lactate   


 


Sodium   


 


Potassium   


 


Chloride   


 


Carbon Dioxide   


 


Anion Gap   


 


BUN   


 


Creatinine   


 


Est GFR ( Amer)   


 


Est GFR (Non-Af Amer)   


 


POC Glucose (mg/dL)   


 


Random Glucose   


 


Hemoglobin A1c   


 


Calcium   


 


Total Bilirubin   


 


AST   


 


ALT   


 


Alkaline Phosphatase   


 


Total Protein   


 


Albumin   


 


Globulin   


 


Albumin/Globulin Ratio   


 


Venous Blood Potassium   


 


Urine Color   


 


Urine Clarity   


 


Urine pH   


 


Ur Specific Gravity   


 


Urine Protein   


 


Urine Glucose (UA)   


 


Urine Ketones   


 


Urine Blood   


 


Urine Nitrate   


 


Urine Bilirubin   


 


Urine Urobilinogen   


 


Ur Leukocyte Esterase   


 


Urine WBC (Auto)   


 


Urine RBC (Auto)   


 


Stool Occult Blood  Positive H  


 


Hepatitis A IgM Ab   


 


Hep Bs Antigen   


 


Hep B Core IgM Ab   


 


Hepatitis C Antibody   


 


Blood Type   


 


Blood Type Confirm   


 


Antibody Screen   














  10/19/17 10/19/17 10/19/17





  06:45 06:45 06:45


 


WBC   


 


RBC   


 


Hgb   


 


Hct   


 


MCV   


 


MCH   


 


MCHC   


 


RDW   


 


Plt Count   


 


MPV   


 


Neut % (Auto)   


 


Lymph % (Auto)   


 


Mono % (Auto)   


 


Eos % (Auto)   


 


Baso % (Auto)   


 


Neut #   


 


Lymph #   


 


Mono #   


 


Eos #   


 


Baso #   


 


Differential Comment   


 


PT  13.1 H  


 


INR  1.2  


 


APTT  26  


 


pO2   


 


VBG pH   


 


VBG pCO2   


 


VBG HCO3   


 


VBG Total CO2   


 


VBG O2 Sat (Calc)   


 


VBG Base Excess   


 


VBG Potassium   


 


Glucose   


 


Lactate   


 


Sodium   139 


 


Potassium   4.3 


 


Chloride   107 


 


Carbon Dioxide   26 


 


Anion Gap   10 


 


BUN   30 H 


 


Creatinine   0.9 


 


Est GFR ( Amer)   > 60 


 


Est GFR (Non-Af Amer)   > 60 


 


POC Glucose (mg/dL)   


 


Random Glucose   77 


 


Hemoglobin A1c    5.2


 


Calcium   7.9 L 


 


Total Bilirubin   0.8 


 


AST   36 


 


ALT   69 


 


Alkaline Phosphatase   43 


 


Total Protein   5.2 L 


 


Albumin   2.6 L 


 


Globulin   2.6 


 


Albumin/Globulin Ratio   1.0 


 


Venous Blood Potassium   


 


Urine Color   


 


Urine Clarity   


 


Urine pH   


 


Ur Specific Gravity   


 


Urine Protein   


 


Urine Glucose (UA)   


 


Urine Ketones   


 


Urine Blood   


 


Urine Nitrate   


 


Urine Bilirubin   


 


Urine Urobilinogen   


 


Ur Leukocyte Esterase   


 


Urine WBC (Auto)   


 


Urine RBC (Auto)   


 


Stool Occult Blood   


 


Hepatitis A IgM Ab   


 


Hep Bs Antigen   


 


Hep B Core IgM Ab   


 


Hepatitis C Antibody   


 


Blood Type   


 


Blood Type Confirm   


 


Antibody Screen   














  10/19/17





  06:45


 


WBC 


 


RBC 


 


Hgb 


 


Hct 


 


MCV 


 


MCH 


 


MCHC 


 


RDW 


 


Plt Count 


 


MPV 


 


Neut % (Auto) 


 


Lymph % (Auto) 


 


Mono % (Auto) 


 


Eos % (Auto) 


 


Baso % (Auto) 


 


Neut # 


 


Lymph # 


 


Mono # 


 


Eos # 


 


Baso # 


 


Differential Comment 


 


PT 


 


INR 


 


APTT 


 


pO2 


 


VBG pH 


 


VBG pCO2 


 


VBG HCO3 


 


VBG Total CO2 


 


VBG O2 Sat (Calc) 


 


VBG Base Excess 


 


VBG Potassium 


 


Glucose 


 


Lactate 


 


Sodium 


 


Potassium 


 


Chloride 


 


Carbon Dioxide 


 


Anion Gap 


 


BUN 


 


Creatinine 


 


Est GFR ( Amer) 


 


Est GFR (Non-Af Amer) 


 


POC Glucose (mg/dL) 


 


Random Glucose 


 


Hemoglobin A1c 


 


Calcium 


 


Total Bilirubin 


 


AST 


 


ALT 


 


Alkaline Phosphatase 


 


Total Protein 


 


Albumin 


 


Globulin 


 


Albumin/Globulin Ratio 


 


Venous Blood Potassium 


 


Urine Color 


 


Urine Clarity 


 


Urine pH 


 


Ur Specific Gravity 


 


Urine Protein 


 


Urine Glucose (UA) 


 


Urine Ketones 


 


Urine Blood 


 


Urine Nitrate 


 


Urine Bilirubin 


 


Urine Urobilinogen 


 


Ur Leukocyte Esterase 


 


Urine WBC (Auto) 


 


Urine RBC (Auto) 


 


Stool Occult Blood 


 


Hepatitis A IgM Ab  Negative


 


Hep Bs Antigen  Negative


 


Hep B Core IgM Ab  Negative


 


Hepatitis C Antibody  Negative


 


Blood Type 


 


Blood Type Confirm 


 


Antibody Screen 














Attending/Attestation





- Attestation


I have personally seen and examined this patient.: Yes


I have fully participated in the care of the patient.: Yes


I have reviewed all pertinent clinical information: Yes


Notes (Text): 





10/19/17 15:08


I have seen and examined patient with GI fellow.  Agree with above 

documentation with the following additions.  In brief, this is a 77 year old 

male with history of obesity, atrial fibrillation on eliquis, HTN, DM who is 

sent from nursing facility for evaluation of worsening anemia in the setting of 

dark stools.  He reports dark black colored stool for the past 3 days along 

with loose consistency which he attributes to use of prune juice which he takes 

for management of chronic constipation.  He denies abdominal pain, nausea, 

vomiting, fever/chills, NSAID use, or weight loss.  He had an attempted 

colonoscopy earlier this year in March 2017 which was aborted due to poor bowel 

preparation.  He was asked to repeat in 3 months but did not follow through.





HTN


DM


Atrial fibrillation on Eliquis (held since 10/16)


Progressive normocytic anemia, stool occult blood positive





- Clear liquid diet as tolerated


- Patient currently receiving 1 unit PRBC transfusion, continue to monitor H/H


- Continue with PPI therapy


- CT imaging ordered by medical team, follow up results


- Will plan for EGD evaluation tomorrow given worsening anemia with presence of 

melena, rule out upper GI bleeding source


- Patient will also repeat colonoscopy given incomplete procedure previously in 

order to exclude for underlying malignancy.  NPO after midnight, will continue 

to monitor patient clinical course

## 2017-10-19 NOTE — CP.PCM.PN
<Omega Ly - Last Filed: 10/19/17 20:28>





Subjective





- Date & Time of Evaluation


Date of Evaluation: 10/19/17


Time of Evaluation: 08:00





- Subjective


Subjective: 





PGY1 Medicine Note for Dr. Lake





Patient seen and examined at bedside this morning. Patient states that feels a 

little bit better than he did yesterday but he states he is still feeling weak 

today. Patient is awake and alert but very lethargic. Patient states that he is 

still experiencing abdominal pain. He states he has not had a BM in 4 days, but 

this is normal for him. Patient states his breathing is slightly better today. 

He reports that the nc oxygen helps. Denies f/c, n/v, d/c, chest pain or 

palpitations.





Objective





- Vital Signs/Intake and Output


Vital Signs (last 24 hours): 


 











Temp Pulse Resp BP Pulse Ox


 


 97.8 F   82   18   119/76   100 


 


 10/19/17 16:39  10/19/17 16:39  10/19/17 16:39  10/19/17 16:39  10/19/17 15:15








Intake and Output: 


 











 10/19/17 10/20/17





 18:59 06:59


 


Intake Total 325 


 


Balance 325 














- Medications


Medications: 


 Current Medications





Aripiprazole (Abilify)  1 mg PO DAILY [facility]


   Last Admin: 10/19/17 11:23 Dose:  Not Given


Bisacodyl (Dulcolax)  5 mg PO HS [facility]


Divalproex Sodium (Depakote Dr Tab)  125 mg PO TID [facility]


   Last Admin: 10/19/17 18:27 Dose:  125 mg


Escitalopram Oxalate (Lexapro)  10 mg PO DAILY [facility]


   Last Admin: 10/19/17 10:23 Dose:  Not Given


Sodium Chloride (Sodium Chloride 0.9%)  1,000 mls @ 60 mls/hr IV .[id number] [facility]


   Last Admin: 10/19/17 11:28 Dose:  60 mls/hr


Magnesium Hydroxide (Milk Of Magnesia)  30 ml PO HS PRN


   PRN Reason: Constipation


Nicotine (Nicoderm Cq)  1 patch TD DAILY [facility]


   Last Admin: 10/19/17 10:30 Dose:  1 patch


Omega-3-Acid Ethyl Esters (Lovaza)  1,000 gm PO BID [facility]


   Last Admin: 10/19/17 18:27 Dose:  1,000 gm


Pantoprazole Sodium (Protonix Ec Tab)  40 mg PO BID [facility]


   Last Admin: 10/19/17 18:28 Dose:  40 mg











- Labs


Labs: 


 





 10/19/17 06:45 





 10/19/17 06:45 





 











PT  13.1 SECONDS (9.7-12.2)  H  10/19/17  06:45    


 


INR  1.2   10/19/17  06:45    


 


APTT  26 SECONDS (21-34)   10/19/17  06:45    














- Head Exam


Head Exam: ATRAUMATIC, NORMAL INSPECTION





- Eye Exam


Eye Exam: EOMI, Normal appearance.  absent: Scleral icterus


Pupil Exam: PERRL





- ENT Exam


ENT Exam: Mucous Membranes Moist





- Respiratory Exam


Respiratory Exam: Decreased Breath Sounds, Rales (b/l bases), NORMAL BREATHING 

PATTERN.  absent: Accessory Muscle Use, Wheezes, Respiratory Distress





- Cardiovascular Exam


Cardiovascular Exam: Irregular Rhythm (afib, rate of 80s on tele), +S1, +S2





- GI/Abdominal Exam


GI & Abdominal Exam: Soft, Tenderness (diffuse).  absent: Distended, Firm, 

Guarding, Rigid





- Extremities Exam


Extremities Exam: Pedal Edema (mild), Tenderness (b/l).  absent: Normal 

Inspection (chronic skin color changes)





- Neurological Exam


Neurological Exam: Alert, Awake, Oriented x3





- Psychiatric Exam


Psychiatric exam: Normal Affect, Normal Mood





- Skin


Skin Exam: Dry, Intact, Warm.  absent: Normal Color





Assessment and Plan





- Assessment and Plan (Free Text)


Plan: 





Anemia due to GI blood loss


Likely secondary to GI bleed


Hgb at admission 7.3


Stool Occult positive


HOLD Eliquis


Transfused 1 unit PRBC (10/18)


Repeat CBC:  still 7.3 on am labs -->transfuse one unit of pRBC (10/19)


PT/PTT: 13.1/26


NPO


Gave 1 dose of Protonix 80mg PO.


Start Protonix 40mg PO BID tomorrow


GI consulted- Dr. Sandhu, help appreciated  


* CLD, NPO after midnight


* EGD friday, 10/20, in AM


* f/u further recs after EGD





Abdominal pain 


Patient c/o midline suprapubic/abdominal pain, described as sharp with 

palpation and worsens with constipation.


Abdomen/Pelvis CT with PO/IV contrast:No evidence of acute pathology in the 

abdomen and pelvis. No significant interval change in the right lower lung 

compared to the previous exam as described above


Stool occult positive





Atrial fibrillation


Home medication: Eliquis 5mg PO BID


HOLD Eliquis due to anemia, hbg 7.3, GI bleed.


Monitor on telemetry.


Cardiology consulted- Dr. Monroy, help appreciated


EKG: Atrial fibrillation @90bpm


Echo: f/u results - awaiting official report





Shortness of breath


Patient uses 2 pillows and elevates the back of bed at home to decrease 

symptoms of SOB.


CXR: f/u results


O2 2L via nasal cannula prn  





Constipation


Continue home medications PRN:


* Milk of Magnesia 30mg PO HS PRN


* Miralax 17gm PO HS


* Lactulose 10gm PO daily


* Dulcolax 5mg PO HS 





Elevated alanine aminotransferase (ALT) level


ALT 69


AST 36


Hepatitis panel: negative


Continue to monitor   





History of tobacco abuse


Continue home medication: Nicoderm patch 





Hyperlipidemia


Continue home medication: Fish oil 1 capsule PO BID





History of bipolar disorder 


Continue home medications for bipolar disorder & depression


* Abilify 2mg daily


* Depakote 125mg PO TID


* Lexapro 10mg PO daily 





Prophylactic measure


SCDs


Protonix 40mg PO BID


HOLD Eliquis due to GI bleed, hgb 7.3


Keep NPO


Fall precaution


PT/OT











<[place] H - Last Filed: 10/20/17 09:03>





Objective





- Vital Signs/Intake and Output


Vital Signs (last 24 hours): 


 











Temp Pulse Resp BP Pulse Ox


 


 97.3 F L  85   18   134/79   99 


 


 10/20/17 07:45  10/20/17 07:45  10/20/17 07:45  10/20/17 07:45  10/20/17 07:45








Intake and Output: 


 











 10/20/17 10/20/17





 06:59 18:59


 


Output Total 150 


 


Balance -150 














- Medications


Medications: 


 Current Medications





Aripiprazole (Abilify)  1 mg PO DAILY [facility]


   Last Admin: 10/19/17 11:23 Dose:  Not Given


Bisacodyl (Dulcolax)  5 mg PO HS [facility]


   Last Admin: 10/19/17 22:23 Dose:  Not Given


Divalproex Sodium (Depakote Dr Tab)  125 mg PO TID [facility]


   Last Admin: 10/19/17 18:27 Dose:  125 mg


Escitalopram Oxalate (Lexapro)  10 mg PO DAILY [facility]


   Last Admin: 10/19/17 10:23 Dose:  Not Given


Sodium Chloride (Sodium Chloride 0.9%)  1,000 mls @ 60 mls/hr IV .[id number] [facility]


   Last Admin: 10/19/17 11:28 Dose:  60 mls/hr


Magnesium Hydroxide (Milk Of Magnesia)  30 ml PO HS PRN


   PRN Reason: Constipation


Nicotine (Nicoderm Cq)  1 patch TD DAILY [facility]


   Last Admin: 10/19/17 10:30 Dose:  1 patch


Omega-3-Acid Ethyl Esters (Lovaza)  1,000 gm PO BID [facility]


   Last Admin: 10/19/17 18:27 Dose:  1,000 gm


Pantoprazole Sodium (Protonix Ec Tab)  40 mg PO BID [facility]


   Last Admin: 10/19/17 18:28 Dose:  40 mg











- Labs


Labs: 


 





 10/20/17 08:05 





 10/20/17 08:05 





 











PT  13.1 SECONDS (9.7-12.2)  H  10/19/17  06:45    


 


INR  1.2   10/19/17  06:45    


 


APTT  26 SECONDS (21-34)   10/19/17  06:45    














Attending/Attestation





- Attestation


I have personally seen and examined this patient.: Yes


I have fully participated in the care of the patient.: Yes


I have reviewed all pertinent clinical information, including history, physical 

exam and plan: Yes


Notes (Text): 








Medical attending: Patient was seen and examined by me, agrees the above note 

by medical resident.





Overnight the patient received 1 unit of PRBCs, and we gave instructions to 

give an additional unit of PRBCs as well.





The patient was taking Eliquis for a history of atrial fibrillation. Currently 

that is on hold at this time due to concerns of GI bleeding. And is currently 

pending a GI evaluation





The patient does have extensive cardiac history besides atrial fibrillation. We'

re going to give the patient as mentioned above additional PRBCs as well as 

intravenous fluids and to start a second peripheral access IV.





Because of the extensive cardiac history will also have to monitor the patient 

for any fluid overload. Also have a repeat chest x-ray done as well.





Thank you very much,


[place]

## 2017-10-19 NOTE — CT
PROCEDURE:  CT Abdomen and Pelvis with contrast



HISTORY:

Abdominal pain



COMPARISON:

Comparison is made to the previous study dated 02/17/2017



TECHNIQUE:

Contrast dose: 100 mL Visipaque 320. 



Axial and reformatted coronal and sagittal CT images of the abdomen 

and pelvis were obtained after IV contrast administration. 



Radiation dose:



Total exam DLP = 1119.62 mGy-cm.



This CT exam was performed using one or more of the following dose 

reduction techniques: Automated exposure control, adjustment of the 

mA and/or kV according to patient size, and/or use of iterative 

reconstruction technique.



FINDINGS:



LOWER THORAX:

Re- demonstration of round consolidation/ mass like structure at the 

right lower lobe has not significantly changed since the previous 

exam and may represent round atelectasis. Re- demonstration of small 

right pleural effusion and pleural thickening. Trace left pleural 

effusion and or pleural thickening is also noted on the left.  

Cardiomegaly. 



LIVER:

Unremarkable. No gross lesion or ductal dilatation. 



GALLBLADDER AND BILE DUCTS:

Unremarkable. 



PANCREAS:

Unremarkable. No gross lesion or ductal dilatation.



SPLEEN:

Unremarkable. 



ADRENALS:

Unremarkable. No mass. 



KIDNEYS AND URETERS:

The kidneys enhance symmetrically.  Again seen are bilateral 

perinephric stranding.  Again seen is partially exophytic cystic 

lesion at the left kidney measures 4.3 x 4.6 centimeter. No evidence 

of hydronephrosis or hydroureter. 



VASCULATURE:

Unremarkable. No aortic aneurysm. 



BOWEL:

Again seen is 2nd portion duodenal diverticulum.  The stomach is not 

distended therefore cannot be evaluated. Mild constipation is noted. 

No evidence of high-grade bowel obstruction. Few scattered colonic 

diverticulosis are seen.  Redundant sigmoid colon extending to the 

right mid abdomen is again noted.



APPENDIX:

No evidence of appendicitis. 



PERITONEUM:

Unremarkable. No free fluid. No free air. 



LYMPH NODES:

Unremarkable. No enlarged lymph nodes. 



BLADDER:

Unremarkable. 



REPRODUCTIVE:

Unremarkable. 



BONES:

No acute fracture. 



OTHER FINDINGS:

None.



IMPRESSION:

No evidence of acute pathology in the abdomen and pelvis.



No significant interval change in the right lower lung compared to 

the previous exam as described above.

## 2017-10-19 NOTE — RAD
PROCEDURE:  CHEST RADIOGRAPH, 1 VIEW



HISTORY:

SOB



COMPARISON:

None available.



FINDINGS:



LUNGS:

Shallow lung volumes. Left costophrenic angle is excluded from the 

exam. No dense consolidation 



PLEURA:

No pneumothorax. Small right pleural effusion pleural thickening 

possible. Left costophrenic angle excluded from exam



CARDIOVASCULAR:

Mild cardial.  Probable top-normal pulmonary venous prominence



OSSEOUS STRUCTURES:

Bilateral shoulder arthrosis.  Thoracic spondylosis lateral left 

clavicle asymmetrical density - -possible old healed fracture here 



VISUALIZED UPPER ABDOMEN:

Normal.



OTHER FINDINGS:

None. 



IMPRESSION:

Limited exam-shallow lung volumes and excluded left costophrenic 

angle 



Small right pleural effusion / thickening 



Cardiomegaly

## 2017-10-20 LAB
ALBUMIN/GLOB SERPL: 1 {RATIO} (ref 1–2.1)
ALP SERPL-CCNC: 51 U/L (ref 38–126)
ALT SERPL-CCNC: 72 U/L (ref 21–72)
AST SERPL-CCNC: 36 U/L (ref 17–59)
BASOPHILS # BLD AUTO: 0 K/UL (ref 0–0.2)
BASOPHILS NFR BLD: 0.6 % (ref 0–2)
BILIRUB SERPL-MCNC: 1.2 MG/DL (ref 0.2–1.3)
BUN SERPL-MCNC: 21 MG/DL (ref 9–20)
CALCIUM SERPL-MCNC: 7.7 MG/DL (ref 8.6–10.4)
CHLORIDE SERPL-SCNC: 106 MMOL/L (ref 98–107)
CO2 SERPL-SCNC: 25 MMOL/L (ref 22–30)
EOSINOPHIL # BLD AUTO: 0.1 K/UL (ref 0–0.7)
EOSINOPHIL NFR BLD: 2.7 % (ref 0–4)
ERYTHROCYTE [DISTWIDTH] IN BLOOD BY AUTOMATED COUNT: 17.1 % (ref 11.5–14.5)
GLOBULIN SER-MCNC: 2.8 GM/DL (ref 2.2–3.9)
GLUCOSE SERPL-MCNC: 60 MG/DL (ref 75–110)
HCT VFR BLD CALC: 27.6 % (ref 35–51)
LYMPHOCYTES # BLD AUTO: 0.9 K/UL (ref 1–4.3)
LYMPHOCYTES NFR BLD AUTO: 16.9 % (ref 20–40)
MCH RBC QN AUTO: 31.1 PG (ref 27–31)
MCHC RBC AUTO-ENTMCNC: 33.6 G/DL (ref 33–37)
MCV RBC AUTO: 92.5 FL (ref 80–94)
MONOCYTES # BLD: 0.5 K/UL (ref 0–0.8)
MONOCYTES NFR BLD: 9.7 % (ref 0–10)
NRBC BLD AUTO-RTO: 0.2 % (ref 0–2)
PLATELET # BLD: 100 K/UL (ref 130–400)
PMV BLD AUTO: 12.8 FL (ref 7.2–11.7)
POTASSIUM SERPL-SCNC: 4.1 MMOL/L (ref 3.6–5.2)
PROT SERPL-MCNC: 5.6 G/DL (ref 6.3–8.3)
SODIUM SERPL-SCNC: 136 MMOL/L (ref 132–148)
WBC # BLD AUTO: 5.5 K/UL (ref 4.8–10.8)

## 2017-10-20 PROCEDURE — B548ZZA ULTRASONOGRAPHY OF SUPERIOR VENA CAVA, GUIDANCE: ICD-10-PCS

## 2017-10-20 PROCEDURE — 0DJ08ZZ INSPECTION OF UPPER INTESTINAL TRACT, VIA NATURAL OR ARTIFICIAL OPENING ENDOSCOPIC: ICD-10-PCS | Performed by: INTERNAL MEDICINE

## 2017-10-20 PROCEDURE — 02HV33Z INSERTION OF INFUSION DEVICE INTO SUPERIOR VENA CAVA, PERCUTANEOUS APPROACH: ICD-10-PCS

## 2017-10-20 RX ADMIN — OMEGA-3-ACID ETHYL ESTERS SCH GM: 900 CAPSULE, LIQUID FILLED ORAL at 21:03

## 2017-10-20 RX ADMIN — DIVALPROEX SODIUM SCH: 125 TABLET, DELAYED RELEASE ORAL at 10:19

## 2017-10-20 RX ADMIN — PANTOPRAZOLE SODIUM SCH: 40 TABLET, DELAYED RELEASE ORAL at 14:57

## 2017-10-20 RX ADMIN — OMEGA-3-ACID ETHYL ESTERS SCH GM: 900 CAPSULE, LIQUID FILLED ORAL at 13:14

## 2017-10-20 RX ADMIN — DIVALPROEX SODIUM SCH MG: 125 TABLET, DELAYED RELEASE ORAL at 14:15

## 2017-10-20 RX ADMIN — OMEGA-3-ACID ETHYL ESTERS SCH: 900 CAPSULE, LIQUID FILLED ORAL at 14:57

## 2017-10-20 RX ADMIN — DIVALPROEX SODIUM SCH MG: 125 TABLET, DELAYED RELEASE ORAL at 21:02

## 2017-10-20 RX ADMIN — Medication SCH MG: at 21:02

## 2017-10-20 NOTE — CARD
--------------- APPROVED REPORT --------------





EXAM: Two-dimensional and M-mode echocardiogram with Doppler and 

color Doppler.



2D DIMENSIONS 

IVSd1.1   (0.7-1.1cm)LVDd4.7   (3.9-5.9cm)

PWd1.0   (0.7-1.1cm)LVDs2.9   (2.5-4.0cm)

FS (%) 38.9   %LVEF (%)69.3   (>50%)



M-Mode DIMENSIONS 

Left Atrium (MM)3.91   (2.5-4.0cm)Aortic Root4.19   (2.2-3.7cm)

Aortic Cusp Exc.2.28   (1.5-2.0cm)



Mitral Valve

E/A ratio0.0



TDI

E/Lateral E'0.0E/Medial E'0.0



Tricuspid Valve

TR Peak [id number] Peak [id number]



 LEFT VENTRICLE 

The left ventricle is normal size.

There is normal left ventricular wall thickness.

The left ventricular function is normal.

The left ventricular ejection fraction is within the normal range.

There is normal LV segmental wall motion.

No left ventricle thrombus noted on this study.

There is no ventricular septal defect visualized.

There is no left ventricular aneurysm. 

There is no mass noted in the left ventricle.



 RIGHT VENTRICLE 

The right ventricle apex is not well visualized.

The right ventricular systolic function is normal.



 ATRIA 

The left atrium size is normal.

The right atrium size is normal.



 AORTIC VALVE 

The aortic valve is normal in structure.

There is trace to mild aortic regurgitation.

There is no aortic valvular stenosis. 

There is no aortic valvular vegetation.



 MITRAL VALVE 

The mitral valve is normal in structure.

There is no mitral valve stenosis.

Mitral regurgitation is trace to mild.



 TRICUSPID VALVE 

The tricuspid valve is normal in structure.

There is no tricuspid valve regurgitation noted.



 PULMONIC VALVE 

The pulmonary valve is normal in structure.



<Conclusion>

There is trace to mild aortic regurgitation.

Mitral regurgitation is trace to mild.

suboptimal study.

## 2017-10-20 NOTE — RAD
HISTORY:

Verify PICC line.



COMPARISON:

Correlation made with chest radiograph 10/19/2017 and concurrent CT 

scan abdomen pelvis which image both lung bases. .  Comparison also 

made with prior CT scan abdomen pelvis 02/17/2017. 



FINDINGS:

Interval placement left-sided PICC line, the tip of which appears to 

be located at the brachiocephalic SVC junction (perpendicular to the 

long axis of the SVC.  This should be revised so that tip and long 

axis of the distal line is within and parallel to the SVC. 



LUNGS:

Re- demonstrated is a elliptical shaped masslike density in the right 

posterior sulcus which may represent chronic rounded atelectasis and 

was present on prior CT scan abdomen pelvis dated 02/17/2017 and 

appears unchanged. Additionally, there are linear and wedge-shaped 

areas of atelectasis/scarring both lung bases. .  Small right-sided 

effusion and trace left-sided effusion poorly seen compared to 

high-resolution CT scan. 



PLEURA:

No significant pleural effusion identified, no pneumothorax apparent.



CARDIOVASCULAR:

Heart appears enlarged.



OSSEOUS STRUCTURES:

Multilevel degenerative spondylosis of the thoracic spine



VISUALIZED UPPER ABDOMEN:

Normal.



OTHER FINDINGS:

None.



IMPRESSION:

Interval placement left-sided PICC line, the tip of which appears to 

be located at the brachiocephalic SVC junction (perpendicular to the 

long axis of the SVC.  This should be revised so that tip and long 

axis of the distal line is within and parallel to the SVC. 



Stable appearing elliptical shaped masslike density right posterior 

sulcus that may represent chronic rounded atelectasis.  A 

additionally, linear/ wedge-shaped areas of atelectasis/ scarring 

both lung bases also again seen. 



Small right-sided effusion and trace left-sided effusion poorly seen 

compared to high-resolution CT scan. 



Cardiomegaly.

## 2017-10-20 NOTE — CP.PCM.PN
<Omega Ly - Last Filed: 10/20/17 12:27>





Subjective





- Date & Time of Evaluation


Date of Evaluation: 10/20/17


Time of Evaluation: 09:23





- Subjective


Subjective: 





PGY1 Medicine Note for Dr. Lake





Patient was seen and examined at bedside this morning. Patient appears very 

lethargic and does not want to speak much. He states he feels very weak and 

tired. He is still NPO this morning for an EGD scheduled for later this 

morning. Patient denies any f/c, n/v, difficulty breathing, chest pain, 

abdominal pain or headaches. 





Objective





- Vital Signs/Intake and Output


Vital Signs (last 24 hours): 


 











Temp Pulse Resp BP Pulse Ox


 


 97.3 F L  85   18   134/79   99 


 


 10/20/17 07:45  10/20/17 07:45  10/20/17 07:45  10/20/17 07:45  10/20/17 07:45








Intake and Output: 


 











 10/20/17 10/20/17





 06:59 18:59


 


Output Total 150 


 


Balance -150 














- Medications


Medications: 


 Current Medications





Aripiprazole (Abilify)  1 mg PO DAILY [facility]


   Last Admin: 10/19/17 11:23 Dose:  Not Given


Bisacodyl (Dulcolax)  5 mg PO HS [facility]


   Last Admin: 10/19/17 22:23 Dose:  Not Given


Divalproex Sodium (Depakote Dr Tab)  125 mg PO TID [facility]


   Last Admin: 10/19/17 18:27 Dose:  125 mg


Escitalopram Oxalate (Lexapro)  10 mg PO DAILY [facility]


   Last Admin: 10/19/17 10:23 Dose:  Not Given


Sodium Chloride (Sodium Chloride 0.9%)  1,000 mls @ 60 mls/hr IV .[id number] [facility]


   Last Admin: 10/19/17 11:28 Dose:  60 mls/hr


Magnesium Hydroxide (Milk Of Magnesia)  30 ml PO HS PRN


   PRN Reason: Constipation


Nicotine (Nicoderm Cq)  1 patch TD DAILY [facility]


   Last Admin: 10/19/17 10:30 Dose:  1 patch


Omega-3-Acid Ethyl Esters (Lovaza)  1,000 gm PO BID [facility]


   Last Admin: 10/19/17 18:27 Dose:  1,000 gm


Pantoprazole Sodium (Protonix Ec Tab)  40 mg PO BID [facility]


   Last Admin: 10/19/17 18:28 Dose:  40 mg











- Labs


Labs: 


 





 10/20/17 08:05 





 10/20/17 08:05 





 











PT  13.1 SECONDS (9.7-12.2)  H  10/19/17  06:45    


 


INR  1.2   10/19/17  06:45    


 


APTT  26 SECONDS (21-34)   10/19/17  06:45    














- Constitutional


Appears: No Acute Distress, Chronically Ill





- Head Exam


Head Exam: ATRAUMATIC, NORMOCEPHALIC





- Eye Exam


Eye Exam: EOMI, Normal appearance





- ENT Exam


ENT Exam: Mucous Membranes Dry





- Respiratory Exam


Respiratory Exam: Decreased Breath Sounds, Rales (b/l), NORMAL BREATHING 

PATTERN.  absent: Accessory Muscle Use, Wheezes, Respiratory Distress





- Cardiovascular Exam


Cardiovascular Exam: Irregular Rhythm, +S1, +S2





- GI/Abdominal Exam


GI & Abdominal Exam: Soft, Normal Bowel Sounds.  absent: Distended, Firm, 

Guarding, Rigid, Tenderness





- Extremities Exam


Extremities Exam: Pedal Edema (mild).  absent: Calf Tenderness, Tenderness





- Neurological Exam


Neurological Exam: Alert, Awake, Oriented x3





- Psychiatric Exam


Psychiatric exam: Depressed


Additional comments: 





very lethargic





- Skin


Skin Exam: Dry, Warm





Assessment and Plan





- Assessment and Plan (Free Text)


Plan: 





Anemia due to GI blood loss


Likely secondary to GI bleed


Hgb at admission 7.3


Stool Occult positive


HOLD Eliquis


Transfused 1 unit PRBC (10/18)


Transfused 1 unit of pRBC (10/19)


Hgb: Increased to 9.3 on 10/20 from 7.3 on 10/19 


PT/PTT: 13.1/26


Protonix 40mg PO daily


GI consulted- Dr. Sandhu, help appreciated  


* CLD


* EGD 10/20/17 - Erosive gastritis and duodenitis. No active bleeding. No 

ulcers. PPI daily. Colonoscopy on Monday.





Abdominal pain 


Patient c/o midline suprapubic/abdominal pain, described as sharp with 

palpation and worsens with constipation.


Abdomen/Pelvis CT with PO/IV contrast:No evidence of acute pathology in the 

abdomen and pelvis. No significant interval change in the right lower lung 

compared to the previous exam as described above


Stool occult positive





Atrial fibrillation


Home medication: Eliquis 5mg PO BID


HOLD Eliquis due to anemia


Monitor on telemetry.


Cardiology consulted- Dr. Monroy, help appreciated


EKG: Atrial fibrillation @90bpm


Echo: LVEF 69.3%, awaiting official report





Shortness of breath


Patient uses 2 pillows and elevates the back of bed at home to decrease 

symptoms of SOB.


CXR: Increased right basilar airspace disease with minimal left airspace 

disease unchanged at the medial base. Small right pleural effusion identified 

increase.


O2 2L via nasal cannula prn  





Constipation


Continue home medications PRN:


* Milk of Magnesia 30mg PO HS PRN


* Miralax 17gm PO HS


* Lactulose 10gm PO daily


* Dulcolax 5mg PO HS 





Elevated alanine aminotransferase (ALT) level


ALT 69


AST 36


Hepatitis panel: negative


Continue to monitor   





History of tobacco abuse


Continue home medication: Nicoderm patch 





Hyperlipidemia


Continue home medication: Fish oil 1 capsule PO BID





History of bipolar disorder 


Continue home medications for bipolar disorder & depression


* Abilify 2mg daily


* Depakote 125mg PO TID


* Lexapro 10mg PO daily 





Prophylactic measure


SCDs


Protonix 40mg PO BID


HOLD Eliquis due to GI bleed


Keep NPO


Fall precaution


PT/OT





Case discussed with Dr. Aleksandra Duff Malachi PGY1





<[place] H - Last Filed: 10/20/17 13:20>





Objective





- Vital Signs/Intake and Output


Vital Signs (last 24 hours): 


 











Temp Pulse Resp BP Pulse Ox


 


 96 F L  100 H  32 H  118/60   100 


 


 10/20/17 10:30  10/20/17 11:00  10/20/17 11:00  10/20/17 11:00  10/20/17 11:00








Intake and Output: 


 











 10/20/17 10/20/17





 06:59 18:59


 


Intake Total  50


 


Output Total 150 


 


Balance -150 50














- Medications


Medications: 


 Current Medications





Aripiprazole (Abilify)  1 mg PO DAILY [facility]


   Last Admin: 10/19/17 11:23 Dose:  Not Given


Bisacodyl (Dulcolax)  5 mg PO HS [facility]


   Last Admin: 10/19/17 22:23 Dose:  Not Given


Divalproex Sodium (Depakote Dr Tab)  125 mg PO TID [facility]


   Last Admin: 10/19/17 18:27 Dose:  125 mg


Escitalopram Oxalate (Lexapro)  10 mg PO DAILY [facility]


   Last Admin: 10/19/17 10:23 Dose:  Not Given


Furosemide (Lasix)  40 mg IVP BID [facility]


Sodium Chloride (Sodium Chloride 0.9%)  1,000 mls @ 60 mls/hr IV .[id number] [facility]


   Last Admin: 10/19/17 11:28 Dose:  60 mls/hr


Magnesium Hydroxide (Milk Of Magnesia)  30 ml PO HS PRN


   PRN Reason: Constipation


Nicotine (Nicoderm Cq)  1 patch TD DAILY [facility]


   Last Admin: 10/19/17 10:30 Dose:  1 patch


Omega-3-Acid Ethyl Esters (Lovaza)  1 gm PO BID [facility]


Pantoprazole Sodium (Protonix Ec Tab)  40 mg PO DAILY [facility]











- Labs


Labs: 


 





 10/20/17 08:05 





 10/20/17 08:05 





 











PT  13.1 SECONDS (9.7-12.2)  H  10/19/17  06:45    


 


INR  1.2   10/19/17  06:45    


 


APTT  26 SECONDS (21-34)   10/19/17  06:45    














Attending/Attestation





- Attestation


I have personally seen and examined this patient.: Yes


I have fully participated in the care of the patient.: Yes


I have reviewed all pertinent clinical information, including history, physical 

exam and plan: Yes


Notes (Text): 





10/20/17 13:20


Medical attending: Patient was seen and examined by me, agrees the above note 

by medical resident.





The patient has received a second unit of PRBCs. The hemoglobin did increase to 

9.3. The patient is also on intravenous fluids as well. A repeat chest x-ray 

shows that probably there is some additional congestion on the chest x-rays 

now. This is probably likely from the fluids as well as the PRBCs that we are 

giving the patient. Regular give the patient IV Lasix at this time.





As mentioned previously the patient has a history of CHF, atrial fibrillation, 

as well as CAD.





He is currently here with anemia, as well as fecal occult positive stools. 

Earlier in the day had EGD done the results of which are not yet available





Thank you very much


[place]

## 2017-10-20 NOTE — CARD
--------------- APPROVED REPORT --------------





EKG Measurement

Heart [id number]

[id number]

[id number]

[id number]



<Conclusion>

Atrial fibrillation

Abnormal ECG

## 2017-10-21 LAB
ALBUMIN/GLOB SERPL: 0.9 {RATIO} (ref 1–2.1)
ALP SERPL-CCNC: 58 U/L (ref 38–126)
ALT SERPL-CCNC: 78 U/L (ref 21–72)
AST SERPL-CCNC: 42 U/L (ref 17–59)
BASOPHILS # BLD AUTO: 0 K/UL (ref 0–0.2)
BASOPHILS NFR BLD: 0.5 % (ref 0–2)
BILIRUB SERPL-MCNC: 1.2 MG/DL (ref 0.2–1.3)
BUN SERPL-MCNC: 17 MG/DL (ref 9–20)
CALCIUM SERPL-MCNC: 7.9 MG/DL (ref 8.6–10.4)
CHLORIDE SERPL-SCNC: 99 MMOL/L (ref 98–107)
CO2 SERPL-SCNC: 29 MMOL/L (ref 22–30)
EOSINOPHIL # BLD AUTO: 0.1 K/UL (ref 0–0.7)
EOSINOPHIL NFR BLD: 2.7 % (ref 0–4)
ERYTHROCYTE [DISTWIDTH] IN BLOOD BY AUTOMATED COUNT: 17.2 % (ref 11.5–14.5)
GLOBULIN SER-MCNC: 3.3 GM/DL (ref 2.2–3.9)
GLUCOSE SERPL-MCNC: 61 MG/DL (ref 75–110)
HCT VFR BLD CALC: 29.2 % (ref 35–51)
LYMPHOCYTES # BLD AUTO: 0.7 K/UL (ref 1–4.3)
LYMPHOCYTES NFR BLD AUTO: 13.5 % (ref 20–40)
MCH RBC QN AUTO: 31 PG (ref 27–31)
MCHC RBC AUTO-ENTMCNC: 33.9 G/DL (ref 33–37)
MCV RBC AUTO: 91.4 FL (ref 80–94)
MONOCYTES # BLD: 0.5 K/UL (ref 0–0.8)
MONOCYTES NFR BLD: 9.2 % (ref 0–10)
NRBC BLD AUTO-RTO: 0.2 % (ref 0–2)
PLATELET # BLD: 121 K/UL (ref 130–400)
PMV BLD AUTO: 13.1 FL (ref 7.2–11.7)
POTASSIUM SERPL-SCNC: 3.5 MMOL/L (ref 3.6–5.2)
PROT SERPL-MCNC: 6.4 G/DL (ref 6.3–8.3)
SODIUM SERPL-SCNC: 137 MMOL/L (ref 132–148)
WBC # BLD AUTO: 5.5 K/UL (ref 4.8–10.8)

## 2017-10-21 RX ADMIN — DIVALPROEX SODIUM SCH MG: 125 TABLET, DELAYED RELEASE ORAL at 09:30

## 2017-10-21 RX ADMIN — CEFTRIAXONE SCH MLS/HR: 1 INJECTION, SOLUTION INTRAVENOUS at 10:27

## 2017-10-21 RX ADMIN — PANTOPRAZOLE SODIUM SCH MG: 40 TABLET, DELAYED RELEASE ORAL at 09:29

## 2017-10-21 RX ADMIN — DEXTROSE AND SODIUM CHLORIDE SCH MLS/HR: 5; 900 INJECTION, SOLUTION INTRAVENOUS at 09:21

## 2017-10-21 RX ADMIN — OMEGA-3-ACID ETHYL ESTERS SCH GM: 900 CAPSULE, LIQUID FILLED ORAL at 09:30

## 2017-10-21 RX ADMIN — CEFTRIAXONE SCH MLS/HR: 1 INJECTION, SOLUTION INTRAVENOUS at 09:18

## 2017-10-21 RX ADMIN — Medication SCH MG: at 23:21

## 2017-10-21 RX ADMIN — CEFTRIAXONE SCH MLS/HR: 1 INJECTION, SOLUTION INTRAVENOUS at 23:23

## 2017-10-21 RX ADMIN — DIVALPROEX SODIUM SCH MG: 125 TABLET, DELAYED RELEASE ORAL at 13:25

## 2017-10-21 RX ADMIN — DEXTROSE AND SODIUM CHLORIDE SCH: 5; 900 INJECTION, SOLUTION INTRAVENOUS at 22:48

## 2017-10-21 RX ADMIN — OMEGA-3-ACID ETHYL ESTERS SCH GM: 900 CAPSULE, LIQUID FILLED ORAL at 19:12

## 2017-10-21 RX ADMIN — DIVALPROEX SODIUM SCH MG: 125 TABLET, DELAYED RELEASE ORAL at 19:12

## 2017-10-21 NOTE — CP.PCM.PN
<Natalee Hdz - Last Filed: 10/21/17 09:35>





Subjective





- Date & Time of Evaluation


Date of Evaluation: 10/21/17


Time of Evaluation: 08:40





- Subjective


Subjective: 


GI Fellow PGY4 Progress Note


Pt seen and evaluated at bedside, pt denies bleeding and none reported by 

nursing over night. Pt does report some mild periumbilical abdominal pain. Pt 

reports that he is urinating alot and wetting the bed, pt is on IV lasixs. 





ROS: A 12pt ROS was obtained and was negative except as above. 








Objective





- Vital Signs/Intake and Output


Vital Signs (last 24 hours): 


 











Temp Pulse Resp BP Pulse Ox


 


 97.7 F   99 H  20   126/76   97 


 


 10/21/17 07:00  10/21/17 07:00  10/21/17 07:00  10/21/17 07:00  10/21/17 07:00








Intake and Output: 


 











 10/21/17 10/21/17





 06:59 18:59


 


Intake Total 500 


 


Output Total 1100 


 


Balance -600 














- Medications


Medications: 


 Current Medications





Aripiprazole (Abilify)  1 mg PO DAILY [facility]


   Last Admin: 10/20/17 10:18 Dose:  Not Given


Bisacodyl (Dulcolax)  5 mg PO HS [facility]


   Last Admin: 10/20/17 21:02 Dose:  5 mg


Divalproex Sodium (Depakote Dr Tab)  125 mg PO TID [facility]


   Last Admin: 10/20/17 21:02 Dose:  125 mg


Escitalopram Oxalate (Lexapro)  10 mg PO DAILY [facility]


   Last Admin: 10/20/17 10:19 Dose:  Not Given


Furosemide (Lasix)  40 mg IVP Q12H [facility]


   Last Admin: 10/21/17 01:14 Dose:  40 mg


Ceftriaxone Sodium (Rocephin Iv 1 Gm Duplex)  50 mls @ 100 mls/hr IVPB Q12H [facility]


   Last Admin: 10/21/17 09:18 Dose:  100 mls/hr


Azithromycin 500 mg/ Sodium (Chloride)  250 mls @ 250 mls/hr IVPB DAILY [facility]


   Last Admin: 10/21/17 09:19 Dose:  250 mls/hr


Dextrose/Sodium Chloride (Dextrose 5%/0.9% Ns 1000 Ml)  1,000 mls @ 70 mls/hr 

IV .[id number] [facility]


   Last Admin: 10/21/17 09:21 Dose:  70 mls/hr


Magnesium Hydroxide (Milk Of Magnesia)  30 ml PO HS PRN


   PRN Reason: Constipation


Nicotine (Nicoderm Cq)  1 patch TD DAILY [facility]


   Last Admin: 10/20/17 14:13 Dose:  1 patch


Omega-3-Acid Ethyl Esters (Lovaza)  1 gm PO BID [facility]


   Last Admin: 10/20/17 21:03 Dose:  1 gm


Pantoprazole Sodium (Protonix Ec Tab)  40 mg PO DAILY [facility]











- Labs


Labs: 


 





 10/21/17 06:07 





 10/21/17 06:07 





 











PT  13.1 SECONDS (9.7-12.2)  H  10/19/17  06:45    


 


INR  1.2   10/19/17  06:45    


 


APTT  26 SECONDS (21-34)   10/19/17  06:45    














- Constitutional


Appears: No Acute Distress, Chronically Ill





- Head Exam


Head Exam: ATRAUMATIC, NORMAL INSPECTION, NORMOCEPHALIC





- Eye Exam


Eye Exam: EOMI, Normal appearance, PERRL


Pupil Exam: PERRL





- ENT Exam


ENT Exam: Mucous Membranes Moist, Normal Exam





- Neck Exam


Neck Exam: Full ROM, Normal Inspection





- Respiratory Exam


Respiratory Exam: Decreased Breath Sounds, NORMAL BREATHING PATTERN





- Cardiovascular Exam


Cardiovascular Exam: Irregular Rhythm, +S1





- GI/Abdominal Exam


GI & Abdominal Exam: Soft, Tenderness, Normal Bowel Sounds.  absent: Distended, 

Guarding, Organomegaly





- Rectal Exam


Rectal Exam: Deferred





- Extremities Exam


Extremities Exam: Pedal Edema





- Neurological Exam


Neurological Exam: Alert, Awake, Oriented x3





- Psychiatric Exam


Psychiatric exam: Normal Affect, Normal Mood





- Skin


Skin Exam: Dry, Intact, Normal Color, Warm





Assessment and Plan





- Assessment and Plan (Free Text)


Assessment: 


This is a 77 year old male with history of Atrial fibrillation on Eliquis, 

Hypertension, Hyperlipidemia, CAD, Diabetes, Depression, Bipolar disorder, 

osteoporosis, and constipation presenting from nursing facility due to black 

stools and anemia. 





1. Melena


2. Anemia s/p 2U PRBCs


3. Afib on OAC Eliquis on hold since 10/16/17


4. Gastritis, Duodenitis 








Plan: 


-No further active GI bleed, no melena, Hgb stable after 2UPRBCs, 

hemodynamically stable


-Continue to monitor H/H, goal Hgb>8


-s/p EGD with no source of active GI bleed, Gastritis, Duodenitis


-Continue PPI 40mg po daily 


-Pt's OAC Eliquis is on hold since 10/16/17


-Continue clear liquid diet 


-Plan for colonoscopy on Monday, will start bowel prep tomorrow 


-Prior colonoscopy March 2017 showed poor prep and incomplete exam to sigmoid 

colon


-Will Continue to follow pt closely 








<Scotty Randolph - Last Filed: 10/21/17 11:31>





Objective





- Vital Signs/Intake and Output


Vital Signs (last 24 hours): 


 











Temp Pulse Resp BP Pulse Ox


 


 97.7 F   99 H  20   126/76   97 


 


 10/21/17 07:00  10/21/17 07:00  10/21/17 07:00  10/21/17 07:00  10/21/17 07:00








Intake and Output: 


 











 10/21/17 10/21/17





 06:59 18:59


 


Intake Total 500 


 


Output Total 1100 


 


Balance -600 














- Medications


Medications: 


 Current Medications





Aripiprazole (Abilify)  1 mg PO DAILY [facility]


   Last Admin: 10/21/17 09:31 Dose:  1 mg


Bisacodyl (Dulcolax)  5 mg PO HS [facility]


   Last Admin: 10/20/17 21:02 Dose:  5 mg


Divalproex Sodium (Depakote Dr Tab)  125 mg PO TID [facility]


   Last Admin: 10/21/17 09:30 Dose:  125 mg


Escitalopram Oxalate (Lexapro)  10 mg PO DAILY [facility]


   Last Admin: 10/21/17 09:30 Dose:  10 mg


Furosemide (Lasix)  40 mg IVP Q12H [facility]


   Last Admin: 10/21/17 01:14 Dose:  40 mg


Ceftriaxone Sodium (Rocephin Iv 1 Gm Duplex)  50 mls @ 100 mls/hr IVPB Q12H [facility]


   Last Admin: 10/21/17 10:27 Dose:  100 mls/hr


Azithromycin 500 mg/ Sodium (Chloride)  250 mls @ 250 mls/hr IVPB DAILY [facility]


   Last Admin: 10/21/17 10:28 Dose:  250 mls/hr


Dextrose/Sodium Chloride (Dextrose 5%/0.9% Ns 1000 Ml)  1,000 mls @ 70 mls/hr 

IV .[id number] [facility]


   Last Admin: 10/21/17 09:21 Dose:  70 mls/hr


Magnesium Hydroxide (Milk Of Magnesia)  30 ml PO HS PRN


   PRN Reason: Constipation


Nicotine (Nicoderm Cq)  1 patch TD DAILY [facility]


   Last Admin: 10/21/17 09:30 Dose:  1 patch


Omega-3-Acid Ethyl Esters (Lovaza)  1 gm PO BID [facility]


   Last Admin: 10/21/17 09:30 Dose:  1 gm


Pantoprazole Sodium (Protonix Ec Tab)  40 mg PO DAILY [facility]


   Last Admin: 10/21/17 09:29 Dose:  40 mg











- Labs


Labs: 


 





 10/21/17 06:07 





 10/21/17 06:07 





 











PT  13.1 SECONDS (9.7-12.2)  H  10/19/17  06:45    


 


INR  1.2   10/19/17  06:45    


 


APTT  26 SECONDS (21-34)   10/19/17  06:45    














Attending/Attestation





- Attestation


I have personally seen and examined this patient.: Yes


I have fully participated in the care of the patient.: Yes


I have reviewed all pertinent clinical information, including history, physical 

exam and plan: Yes


Notes (Text): 





10/21/17 11:30


77 year old male with h/o Afib on Eliquis, HTN, HLD, DM, CAD admitted with 

anemia/melena. 


1. Melena


2. Anemia 


3. Erosive gastritis


Plan: 


-no active bleeding at the moment


-erosive gatritis and duodenitis noted


-avoid nsaids


-ppi daily


-check h pylori stool ag


-plan for colonoscopy monday


-clear liquid diet in the meantime

## 2017-10-21 NOTE — CP.PCM.PN
Subjective





- Date & Time of Evaluation


Date of Evaluation: 10/21/17


Time of Evaluation: 08:00





- Subjective


Subjective: 





Patient was seen and examined by me.





As mentioned previously he has had anemia and brought in to hospital from 

nursing home. There is a history of atrial fibrillation and he was on Eliquis 

which is currently on hold. He is S/P EGD, probably colonscopy this coming 

Monday. He had two units of PRBCs and Hgb is up 9.3 today.





The patient reported that he has be urinating a lot as he is on lasix and ran 

out of urinals last night and because of this he was chronically wetting his 

bed and he was upset with this. I reviewed his CXRAY and the plerural effusions 

are small so will stop lasix. 





The recorded sugars are low, appetite has been poor. 








Objective





- Vital Signs/Intake and Output


Vital Signs (last 24 hours): 


 











Temp Pulse Resp BP Pulse Ox


 


 97.6 F   82   20   110/80   99 


 


 10/20/17 23:50  10/20/17 23:50  10/20/17 23:50  10/21/17 01:14  10/20/17 23:50








Intake and Output: 


 











 10/21/17 10/21/17





 06:59 18:59


 


Intake Total 500 


 


Output Total 1100 


 


Balance -600 














- Medications


Medications: 


 Current Medications





Aripiprazole (Abilify)  1 mg PO DAILY [facility]


   Last Admin: 10/20/17 10:18 Dose:  Not Given


Bisacodyl (Dulcolax)  5 mg PO HS [facility]


   Last Admin: 10/20/17 21:02 Dose:  5 mg


Divalproex Sodium (Depakote Dr Tab)  125 mg PO TID [facility]


   Last Admin: 10/20/17 21:02 Dose:  125 mg


Escitalopram Oxalate (Lexapro)  10 mg PO DAILY [facility]


   Last Admin: 10/20/17 10:19 Dose:  Not Given


Furosemide (Lasix)  40 mg IVP Q12H [facility]


   Last Admin: 10/21/17 01:14 Dose:  40 mg


Sodium Chloride (Sodium Chloride 0.9%)  1,000 mls @ 60 mls/hr IV .[id number] [facility]


   Last Admin: 10/20/17 18:00 Dose:  60 mls/hr


Ceftriaxone Sodium (Rocephin Iv 1 Gm Duplex)  50 mls @ 100 mls/hr IVPB Q12H [facility]


Azithromycin 500 mg/ Sodium (Chloride)  250 mls @ 250 mls/hr IVPB DAILY [facility]


Magnesium Hydroxide (Milk Of Magnesia)  30 ml PO HS PRN


   PRN Reason: Constipation


Nicotine (Nicoderm Cq)  1 patch TD DAILY [facility]


   Last Admin: 10/20/17 14:13 Dose:  1 patch


Omega-3-Acid Ethyl Esters (Lovaza)  1 gm PO BID [facility]


   Last Admin: 10/20/17 21:03 Dose:  1 gm


Pantoprazole Sodium (Protonix Ec Tab)  40 mg PO DAILY [facility]











- Labs


Labs: 


 





 10/21/17 06:07 





 10/21/17 06:07 





 











PT  13.1 SECONDS (9.7-12.2)  H  10/19/17  06:45    


 


INR  1.2   10/19/17  06:45    


 


APTT  26 SECONDS (21-34)   10/19/17  06:45    














- Constitutional


Appears: Well, No Acute Distress, Older Than Stated Age





- Head Exam


Head Exam: NORMAL INSPECTION, NORMOCEPHALIC





- Eye Exam


Eye Exam: EOMI





- ENT Exam


ENT Exam: Mucous Membranes Moist





- Respiratory Exam


Respiratory Exam: Rales, Rhonchi





- Cardiovascular Exam


Cardiovascular Exam: Irregular Rhythm





- GI/Abdominal Exam


GI & Abdominal Exam: Distended, Soft, Normal Bowel Sounds.  absent: Firm, 

Guarding, Rigid, Tenderness





- Neurological Exam


Neurological Exam: Alert, Awake, Oriented x3


Neuro motor strength exam: Left Upper Extremity: 5, Right Upper Extremity: 5





- Psychiatric Exam


Psychiatric exam: Normal Affect, Normal Mood





- Skin


Skin Exam: Pallor, Pallor, Warm





Assessment and Plan





- Assessment and Plan (Free Text)


Assessment: 











Anemia due to GI blood loss


10/21: Hgb now 9.9, he has had two units of PRBCs. S/P EGD yesterday showing 

only erosive gastritis and duodentitis without acute bleeding or areas of 

recent bleeding. Patient's anticoagulation on hold. Maybe colonoscopy this 

Monday. 





Likely secondary to GI bleed


Hgb at admission 7.3


Stool Occult positive


HOLD Eliquis


Transfused 1 unit PRBC (10/18)


Transfused 1 unit of pRBC (10/19)


Hgb: Increased to 9.3 on 10/20 from 7.3 on 10/19 


PT/PTT: 13.1/26


Protonix 40mg PO daily


GI consulted- Dr. Sandhu, help appreciated  


* CLD


* EGD 10/20/17 - Erosive gastritis and duodenitis. No active bleeding. No 

ulcers. PPI daily. Colonoscopy on Monday.





Abdominal pain 


Patient c/o midline suprapubic/abdominal pain, described as sharp with 

palpation and worsens with constipation.


Abdomen/Pelvis CT with PO/IV contrast:No evidence of acute pathology in the 

abdomen and pelvis. No significant interval change in the right lower lung 

compared to the previous exam as described above


Stool occult positive





Atrial fibrillation


10/21: Review of telemetry overnight shows HR in the 90s, irregular irregular. 

Eliquis is on hold. He has a pretty high HAS-BLED score.


Home medication: Eliquis 5mg PO BID


HOLD Eliquis due to anemia


Monitor on telemetry.


Cardiology consulted- Dr. Monroy, help appreciated


EKG: Atrial fibrillation @90bpm


Echo: LVEF 69.3%, awaiting official report





Shortness of breath


10/21: Will hold the lasix today. CXRAYs showing minimal pleural effusions, he 

is also upset about constant urination. Will monitor.


The CXRAY suggestive of infiltrates on report. Will add rocephin and 

azithromycin for the time being, contnue to monitor. 








Patient uses 2 pillows and elevates the back of bed at home to decrease 

symptoms of SOB.


CXR: Increased right basilar airspace disease with minimal left airspace 

disease unchanged at the medial base. Small right pleural effusion identified 

increase.


O2 2L via nasal cannula prn  





Constipation


Continue home medications PRN:


* Milk of Magnesia 30mg PO HS PRN


* Miralax 17gm PO HS


* Lactulose 10gm PO daily


* Dulcolax 5mg PO HS 





Elevated alanine aminotransferase (ALT) level


ALT 69


AST 36


Hepatitis panel: negative


Continue to monitor   





History of tobacco abuse


Continue home medication: Nicoderm patch 





Hyperlipidemia


Continue home medication: Fish oil 1 capsule PO BID





History of bipolar disorder 


Continue home medications for bipolar disorder & depression


* Abilify 2mg daily


* Depakote 125mg PO TID


* Lexapro 10mg PO daily 





Prophylactic measure


SCDs


Protonix 40mg PO BID


HOLD Eliquis due to GI bleed


Keep NPO


Fall precaution


PT/OT

## 2017-10-21 NOTE — CON
DATE:



CARDIOLOGY CONSULTATION



HISTORY OF PRESENT ILLNESS:  A 77-year-old gentleman was brought in with

hemoglobin of 7.3.  He was found to be in atrial fibrillation.  Cardiac

evaluation is requested.  History is obtained from the patient and review

of the chart.  He has a longstanding history of CAD, hypertension and

possibly heart failure as per chart review.  His hemoglobin was 9.2 at

[facility], which he has been there for many years and in two

weeks it had dropped to 7.3.  The patient was given Lasix about 2 weeks ago

because of shortness of breath and edema and he lost 40 pounds as per chart

review.  Last 2 to 3 weeks, he has been essentially bed confined.



PERSONAL HISTORY:  One pack per day smoker until one month ago.  No EtOH

abuse.



ALLERGIES:  DENIED.



FAMILY HISTORY:  Negative for premature coronary artery disease.



REVIEW OF SYSTEMS:  Denies weakness.  He has noted no fever, no chills. 

Shortness of breath on minimal exertion, although he does not walk around

much.  No abdominal pain.  No hematemesis.  No melena.  No urinary

complaints.  No hematuria.  No history of TIAs or CVAs.  No history of

depression.  No visual disturbances.  Hearing is somewhat impaired.  No

hemoptysis.  Multiple joint pains and arthritis.



CURRENT MEDICATIONS:  Include Abilify, Depakote, Lasix, Lexapro, Lovaza,

Nicoderm patch and Protonix.



The patient had EEG done and he is scheduled for endoscopy and colonoscopy

next week.



PHYSICAL EXAMINATION:

GENERAL:  Shows an elderly gentleman in no acute distress.

VITAL SIGNS:  He is 5 feet 8 inches, weight is 207 pounds, blood pressure

is 110/70, heart rate of 78, irregular, respiratory rate of 20, afebrile.

HEENT:  No neck vein distention.  _____ are multiple.

NECK:  Supple.

LUNGS:  Shows scattered rhonchi.

HEART:  Sounds are distant and irregular.  No definite gallops or murmurs.

ABDOMEN:  Soft.

EXTREMITIES:  No edema.  Distal pulses are 1+.



LABORATORY DATA:  EKG, atrial fibrillation.  Echocardiogram report is

showing normal LV systolic function.  No significant valvular disease.



ASSESSMENT:  This is a 77-year-old gentleman with atrial fibrillation.



RECOMMENDATION:  Given significant anemia and bedridden status at this

point, would not recommend any anticoagulation.  GI workup.  The patient's

heart rate seems to be controlled without any beta blockers or digoxin. 

Heart rate varies from 70 to 80s and 90s.  We will continue with current

medication.  May add beta blocker and a small dose of diuretics as

necessary.



I thank you kindly and we will follow only as needed.





__________________________________________

Byaron Monroy MD



DD:  10/20/2017 12:27:29

DT:  10/20/2017 13:15:56

Job # [id number]

## 2017-10-22 RX ADMIN — OMEGA-3-ACID ETHYL ESTERS SCH GM: 900 CAPSULE, LIQUID FILLED ORAL at 17:54

## 2017-10-22 RX ADMIN — DEXTROSE AND SODIUM CHLORIDE SCH: 5; 900 INJECTION, SOLUTION INTRAVENOUS at 13:41

## 2017-10-22 RX ADMIN — DEXTROSE AND SODIUM CHLORIDE SCH MLS/HR: 5; 900 INJECTION, SOLUTION INTRAVENOUS at 03:01

## 2017-10-22 RX ADMIN — CEFTRIAXONE SCH MLS/HR: 1 INJECTION, SOLUTION INTRAVENOUS at 21:34

## 2017-10-22 RX ADMIN — OMEGA-3-ACID ETHYL ESTERS SCH GM: 900 CAPSULE, LIQUID FILLED ORAL at 09:23

## 2017-10-22 RX ADMIN — DIVALPROEX SODIUM SCH MG: 125 TABLET, DELAYED RELEASE ORAL at 17:54

## 2017-10-22 RX ADMIN — DEXTROSE AND SODIUM CHLORIDE SCH MLS/HR: 5; 900 INJECTION, SOLUTION INTRAVENOUS at 19:42

## 2017-10-22 RX ADMIN — DIVALPROEX SODIUM SCH MG: 125 TABLET, DELAYED RELEASE ORAL at 09:23

## 2017-10-22 RX ADMIN — DIVALPROEX SODIUM SCH MG: 125 TABLET, DELAYED RELEASE ORAL at 13:45

## 2017-10-22 RX ADMIN — CEFTRIAXONE SCH MLS/HR: 1 INJECTION, SOLUTION INTRAVENOUS at 09:00

## 2017-10-22 RX ADMIN — PANTOPRAZOLE SODIUM SCH MG: 40 TABLET, DELAYED RELEASE ORAL at 09:39

## 2017-10-22 NOTE — CP.PCM.PN
Subjective





- Date & Time of Evaluation


Date of Evaluation: 10/22/17


Time of Evaluation: 08:00





- Subjective


Subjective: 





Patient was seen and examined by me. 





Currently pending the lab work for this morning. Yesterday's Hgb was up to 9.9


He denied chest pain, denied shortness of breath, denied weakness. He reports 

he slept better yesterday after I stopped the lasix after seeing CXRAY


(I gave him some diureses since he does have an extensive cardiac history and 

he has been getting a lot of volume from PRBCs and IVF)





From what I understand the patient is pending colonscopy prep later today. 





Also I spoke with the patient's PMD Dr Claros and gave him an update. As 

mentioned previously he has had anemia and brought in to hospital from nursing 

home. There is a history of atrial fibrillation and he was on Eliquis which is 

currently on hold. He is S/P EGD. Colonscopy this coming Monday. 





Objective





- Vital Signs/Intake and Output


Vital Signs (last 24 hours): 


 











Temp Pulse Resp BP Pulse Ox


 


 97.5 F L  89   20   107/66   97 


 


 10/21/17 23:35  10/22/17 07:30  10/21/17 23:35  10/21/17 23:35  10/21/17 23:35








Intake and Output: 


 











 10/22/17 10/22/17





 06:59 18:59


 


Intake Total 1520 


 


Output Total 200 


 


Balance 1320 














- Medications


Medications: 


 Current Medications





Aripiprazole (Abilify)  1 mg PO DAILY [facility]


   Last Admin: 10/21/17 09:31 Dose:  1 mg


Bisacodyl (Dulcolax)  10 mg PO ONCE ONE


   Stop: 10/22/17 20:01


Divalproex Sodium (Depakote Dr Tab)  125 mg PO TID [facility]


   Last Admin: 10/21/17 19:12 Dose:  125 mg


Escitalopram Oxalate (Lexapro)  10 mg PO DAILY [facility]


   Last Admin: 10/21/17 09:30 Dose:  10 mg


Ceftriaxone Sodium (Rocephin Iv 1 Gm Duplex)  50 mls @ 100 mls/hr IVPB Q12H [facility]


   Last Admin: 10/21/17 23:23 Dose:  100 mls/hr


Azithromycin 500 mg/ Sodium (Chloride)  250 mls @ 250 mls/hr IVPB DAILY [facility]


   Last Admin: 10/21/17 10:28 Dose:  250 mls/hr


Dextrose/Sodium Chloride (Dextrose 5%/0.9% Ns 1000 Ml)  1,000 mls @ 70 mls/hr 

IV .[id number] [facility]


   Last Admin: 10/22/17 03:01 Dose:  70 mls/hr


Nicotine (Nicoderm Cq)  1 patch TD DAILY [facility]


   Last Admin: 10/21/17 09:30 Dose:  1 patch


Omega-3-Acid Ethyl Esters (Lovaza)  1 gm PO BID [facility]


   Last Admin: 10/21/17 19:12 Dose:  1 gm


Pantoprazole Sodium (Protonix Ec Tab)  40 mg PO DAILY [facility]


   Last Admin: 10/21/17 09:29 Dose:  40 mg


Polyethylene Glycol/Electrolytes (Golytely)  4,000 ml PO ONCE ONE


   Stop: 10/22/17 13:01











- Labs


Labs: 


 





 10/21/17 06:07 





 10/21/17 06:07 





 











PT  13.1 SECONDS (9.7-12.2)  H  10/19/17  06:45    


 


INR  1.2   10/19/17  06:45    


 


APTT  26 SECONDS (21-34)   10/19/17  06:45    














Assessment and Plan





- Assessment and Plan (Free Text)


Assessment: 





Anemia due to GI blood loss


10/22: Pending morning lab work. The patient will be undergoing prep for 

colonoscopy tomorrow 


10/21: Hgb now 9.9, he has had two units of PRBCs. S/P EGD yesterday showing 

only erosive gastritis and duodentitis without acute bleeding or areas of 

recent bleeding. Patient's anticoagulation on hold. Maybe colonoscopy this 

Monday. 





Likely secondary to GI bleed


Hgb at admission 7.3


Stool Occult positive


HOLD Eliquis


Transfused 1 unit PRBC (10/18)


Transfused 1 unit of pRBC (10/19)


Protonix 40mg PO daily


GI consulted- Dr. Sandhu, help appreciated  


* CLD


* EGD 10/20/17 - Erosive gastritis and duodenitis. No active bleeding. No 

ulcers. PPI daily. Colonoscopy on Monday.





Abdominal pain 


Patient c/o midline suprapubic/abdominal pain, described as sharp with 

palpation and worsens with constipation.


Abdomen/Pelvis CT with PO/IV contrast:No evidence of acute pathology in the 

abdomen and pelvis. No significant interval change in the right lower lung 

compared to the previous exam as described above


Stool occult positive





Atrial fibrillation


10/22: Remains in atrial fibrillation with the rates mostly in the 90s. 


10/21: Review of telemetry overnight shows HR in the 90s, irregular irregular. 

Eliquis is on hold. He has a pretty high HAS-BLED score.


Home medication: Eliquis 5mg PO BID


HOLD Eliquis due to anemia


Monitor on telemetry.


Cardiology consulted- Dr. Monroy, help appreciated


EKG: Atrial fibrillation @90bpm


Echo: LVEF 69.3%, awaiting official report





Shortness of breath, questionable pneumonia


10/22: Continue on IV rocephin and azithromycin, another CXRAY today


10/21: Will hold the lasix today. CXRAYs showing minimal pleural effusions, he 

is also upset about constant urination. Will monitor.


The CXRAY suggestive of infiltrates on report. Will add rocephin and 

azithromycin for the time being, contnue to monitor. 








Patient uses 2 pillows and elevates the back of bed at home to decrease 

symptoms of SOB.


CXR: Increased right basilar airspace disease with minimal left airspace 

disease unchanged at the medial base. Small right pleural effusion identified 

increase.


O2 2L via nasal cannula prn  





Constipation


Continue home medications PRN:


* Milk of Magnesia 30mg PO HS PRN


* Miralax 17gm PO HS


* Lactulose 10gm PO daily


* Dulcolax 5mg PO HS 





History of tobacco abuse


10/22: Per my discussion with primary physician the patient was a heavy smoker.


Continue home medication: Nicoderm patch 





Hyperlipidemia


Continue home medication: Fish oil 1 capsule PO BID





History of bipolar disorder 


Continue home medications for bipolar disorder & depression


* Abilify 2mg daily


* Depakote 125mg PO TID


* Lexapro 10mg PO daily 





Prophylactic measure


SCDs


Protonix 40mg PO BID


HOLD Eliquis due to GI bleed


Keep NPO


Fall precaution


PT/OT

## 2017-10-22 NOTE — CP.PCM.PN
<WilderNatalee - Last Filed: 10/22/17 10:18>





Subjective





- Date & Time of Evaluation


Date of Evaluation: 10/22/17


Time of Evaluation: 09:20





- Subjective


Subjective: 


GI Fellow PGY4 Progress Note


Pt seen and evaluated at bedside, pt denies bleeding and none reported by 

nursing over night. Pt agreeable to colonoscopy tomorrow and will start prep 

today. 


ROS: A 12pt ROS was obtained and was negative except as above. 











Objective





- Vital Signs/Intake and Output


Vital Signs (last 24 hours): 


 











Temp Pulse Resp BP Pulse Ox


 


 97.5 F L  99 H  20   107/66   97 


 


 10/21/17 23:35  10/22/17 00:00  10/21/17 23:35  10/21/17 23:35  10/21/17 23:35








Intake and Output: 


 











 10/22/17 10/22/17





 06:59 18:59


 


Intake Total 1520 


 


Output Total 200 


 


Balance 1320 














- Medications


Medications: 


 Current Medications





Aripiprazole (Abilify)  1 mg PO DAILY [facility]


   Last Admin: 10/21/17 09:31 Dose:  1 mg


Bisacodyl (Dulcolax)  5 mg PO HS [facility]


   Last Admin: 10/21/17 23:21 Dose:  5 mg


Divalproex Sodium (Depakote Dr Tab)  125 mg PO TID [facility]


   Last Admin: 10/21/17 19:12 Dose:  125 mg


Escitalopram Oxalate (Lexapro)  10 mg PO DAILY [facility]


   Last Admin: 10/21/17 09:30 Dose:  10 mg


Ceftriaxone Sodium (Rocephin Iv 1 Gm Duplex)  50 mls @ 100 mls/hr IVPB Q12H [facility]


   Last Admin: 10/21/17 23:23 Dose:  100 mls/hr


Azithromycin 500 mg/ Sodium (Chloride)  250 mls @ 250 mls/hr IVPB DAILY [facility]


   Last Admin: 10/21/17 10:28 Dose:  250 mls/hr


Dextrose/Sodium Chloride (Dextrose 5%/0.9% Ns 1000 Ml)  1,000 mls @ 70 mls/hr 

IV .[id number] [facility]


   Last Admin: 10/22/17 03:01 Dose:  70 mls/hr


Magnesium Hydroxide (Milk Of Magnesia)  30 ml PO HS PRN


   PRN Reason: Constipation


Nicotine (Nicoderm Cq)  1 patch TD DAILY [facility]


   Last Admin: 10/21/17 09:30 Dose:  1 patch


Omega-3-Acid Ethyl Esters (Lovaza)  1 gm PO BID [facility]


   Last Admin: 10/21/17 19:12 Dose:  1 gm


Pantoprazole Sodium (Protonix Ec Tab)  40 mg PO DAILY [facility]


   Last Admin: 10/21/17 09:29 Dose:  40 mg











- Labs


Labs: 


 





 10/21/17 06:07 





 10/21/17 06:07 





 











PT  13.1 SECONDS (9.7-12.2)  H  10/19/17  06:45    


 


INR  1.2   10/19/17  06:45    


 


APTT  26 SECONDS (21-34)   10/19/17  06:45    














- Constitutional


Appears: No Acute Distress, Chronically Ill





- Head Exam


Head Exam: ATRAUMATIC, NORMAL INSPECTION, NORMOCEPHALIC





- Eye Exam


Eye Exam: EOMI, Normal appearance, PERRL


Pupil Exam: PERRL





- ENT Exam


ENT Exam: Mucous Membranes Moist, Normal Exam





- Neck Exam


Neck Exam: Full ROM





- Respiratory Exam


Respiratory Exam: Decreased Breath Sounds, NORMAL BREATHING PATTERN





- Cardiovascular Exam


Cardiovascular Exam: Irregular Rhythm, +S1, +S2





- GI/Abdominal Exam


GI & Abdominal Exam: Soft, Tenderness, Normal Bowel Sounds.  absent: Distended, 

Firm, Guarding, Organomegaly





- Rectal Exam


Rectal Exam: Deferred





- Extremities Exam


Extremities Exam: Pedal Edema





- Back Exam


Back Exam: NORMAL INSPECTION





- Neurological Exam


Neurological Exam: Alert, Awake, Oriented x3





- Psychiatric Exam


Psychiatric exam: Normal Affect, Normal Mood





- Skin


Skin Exam: Dry, Intact, Normal Color, Warm





Assessment and Plan





- Assessment and Plan (Free Text)


Assessment: 


This is a 77 year old male with history of Atrial fibrillation on Eliquis, 

Hypertension, Hyperlipidemia, CAD, Diabetes, Depression, Bipolar disorder, 

osteoporosis, and constipation presenting from nursing facility due to black 

stools and anemia. 





1. Melena


2. Anemia s/p 2U PRBCs


3. Afib on OAC Eliquis on hold since 10/16/17


4. Gastritis, Duodenitis 











Plan: 


-No further active GI bleed, no melena, Hgb stable after 2UPRBCs, 

hemodynamically stable


-Continue to monitor H/H, goal Hgb>8


-s/p EGD with no source of active GI bleed, Gastritis, Duodenitis


-Continue PPI 40mg po daily 


-H.pylori stool antigen pending 


-Continue clear liquid diet 


-NPO after midnight 


-Plan for colonoscopy on Monday


-Start bowel prep today with Golytely and dulcolax 


-Will Continue to follow pt closely 











<Scotty Randolph - Last Filed: 10/22/17 15:54>





Objective





- Vital Signs/Intake and Output


Vital Signs (last 24 hours): 


 











Temp Pulse Resp BP Pulse Ox


 


 98.1 F   94 H  18   107/67   98 


 


 10/22/17 08:05  10/22/17 08:05  10/22/17 08:05  10/22/17 08:05  10/22/17 08:05








Intake and Output: 


 











 10/22/17 10/22/17





 06:59 18:59


 


Intake Total 1520 


 


Output Total 200 


 


Balance 1320 














- Medications


Medications: 


 Current Medications





Aripiprazole (Abilify)  1 mg PO DAILY [facility]


   Last Admin: 10/22/17 09:24 Dose:  1 mg


Bisacodyl (Dulcolax)  10 mg PO ONCE ONE


   Stop: 10/22/17 20:01


Divalproex Sodium (Depakote Dr Tab)  125 mg PO TID [facility]


   Last Admin: 10/22/17 13:45 Dose:  125 mg


Escitalopram Oxalate (Lexapro)  10 mg PO DAILY [facility]


   Last Admin: 10/22/17 09:23 Dose:  10 mg


Ceftriaxone Sodium (Rocephin Iv 1 Gm Duplex)  50 mls @ 100 mls/hr IVPB Q12H [facility]


   Last Admin: 10/22/17 09:00 Dose:  100 mls/hr


Azithromycin 500 mg/ Sodium (Chloride)  250 mls @ 250 mls/hr IVPB DAILY [facility]


   Last Admin: 10/22/17 09:40 Dose:  250 mls/hr


Dextrose/Sodium Chloride (Dextrose 5%/0.9% Ns 1000 Ml)  1,000 mls @ 70 mls/hr 

IV .[id number] [facility]


   Last Admin: 10/22/17 13:41 Dose:  Not Given


Nicotine (Nicoderm Cq)  1 patch TD DAILY [facility]


   Last Admin: 10/22/17 09:24 Dose:  1 patch


Omega-3-Acid Ethyl Esters (Lovaza)  1 gm PO BID [facility]


   Last Admin: 10/22/17 09:23 Dose:  1 gm


Pantoprazole Sodium (Protonix Ec Tab)  40 mg PO DAILY [facility]


   Last Admin: 10/22/17 09:39 Dose:  40 mg











- Labs


Labs: 


 





 10/21/17 06:07 





 10/21/17 06:07 





 











PT  13.1 SECONDS (9.7-12.2)  H  10/19/17  06:45    


 


INR  1.2   10/19/17  06:45    


 


APTT  26 SECONDS (21-34)   10/19/17  06:45    














Attending/Attestation





- Attestation


I have personally seen and examined this patient.: Yes


I have fully participated in the care of the patient.: Yes


I have reviewed all pertinent clinical information, including history, physical 

exam and plan: Yes


Notes (Text): 





10/22/17 15:54


77 year old male with h/o Afib on Eliquis, HTN, HLD, DM, CAD admitted with 

anemia/melena. 


1. Melena


2. Anemia 


3. Erosive gastritis


Plan: 


-erosive gatritis and duodenitis noted


-avoid nsaids


-ppi daily


-await h pylori stool ag


-plan for colonoscopy tomorrow, prep today

## 2017-10-22 NOTE — RAD
HISTORY:

shortness of breath follow up  



COMPARISON:

Comparison chest dated 10/20/2017. 



FINDINGS:



LUNGS:

Marked cardiomegaly. 



The previously noted right lower lobe of masslike density in the 

right posterior sulcus which could represent rounded pneumonia or 

atelectasis is poorly seen on this study compared to high-resolution 

CT scan.  Similarly, small right and trace left-sided effusions along 

with additional subsegmental and linear atelectasis both lung bases. 



PLEURA:

No significant pleural effusion identified, no pneumothorax apparent.



CARDIOVASCULAR:

Marked cardiomegaly.



OSSEOUS STRUCTURES:

Multilevel degenerative spondylosis of the thoracic spine



VISUALIZED UPPER ABDOMEN:

Normal.



OTHER FINDINGS:

None.



IMPRESSION:

The previously noted right lower lobe of masslike density in the 

right posterior sulcus which could represent rounded pneumonia or 

atelectasis is poorly seen on this study compared to high-resolution 

CT scan.  Similarly, small right and trace left-sided effusions along 

with additional subsegmental and linear atelectasis both lung bases.  



MR veronique [email or web address].

## 2017-10-22 NOTE — CP.PCM.PN
Subjective





- Date & Time of Evaluation


Date of Evaluation: 10/22/17


Time of Evaluation: 15:51





- Subjective


Subjective: 





foer colon in am.,


off eliquis.





Objective





- Vital Signs/Intake and Output


Vital Signs (last 24 hours): 


 











Temp Pulse Resp BP Pulse Ox


 


 98.1 F   94 H  18   107/67   98 


 


 10/22/17 08:05  10/22/17 08:05  10/22/17 08:05  10/22/17 08:05  10/22/17 08:05








Intake and Output: 


 











 10/22/17 10/22/17





 06:59 18:59


 


Intake Total 1520 


 


Output Total 200 


 


Balance 1320 














- Medications


Medications: 


 Current Medications





Aripiprazole (Abilify)  1 mg PO DAILY [facility]


   Last Admin: 10/22/17 09:24 Dose:  1 mg


Bisacodyl (Dulcolax)  10 mg PO ONCE ONE


   Stop: 10/22/17 20:01


Divalproex Sodium (Depakote Dr Tab)  125 mg PO TID [facility]


   Last Admin: 10/22/17 13:45 Dose:  125 mg


Escitalopram Oxalate (Lexapro)  10 mg PO DAILY [facility]


   Last Admin: 10/22/17 09:23 Dose:  10 mg


Ceftriaxone Sodium (Rocephin Iv 1 Gm Duplex)  50 mls @ 100 mls/hr IVPB Q12H [facility]


   Last Admin: 10/22/17 09:00 Dose:  100 mls/hr


Azithromycin 500 mg/ Sodium (Chloride)  250 mls @ 250 mls/hr IVPB DAILY [facility]


   Last Admin: 10/22/17 09:40 Dose:  250 mls/hr


Dextrose/Sodium Chloride (Dextrose 5%/0.9% Ns 1000 Ml)  1,000 mls @ 70 mls/hr 

IV .[id number] [facility]


   Last Admin: 10/22/17 13:41 Dose:  Not Given


Nicotine (Nicoderm Cq)  1 patch TD DAILY [facility]


   Last Admin: 10/22/17 09:24 Dose:  1 patch


Omega-3-Acid Ethyl Esters (Lovaza)  1 gm PO BID [facility]


   Last Admin: 10/22/17 09:23 Dose:  1 gm


Pantoprazole Sodium (Protonix Ec Tab)  40 mg PO DAILY [facility]


   Last Admin: 10/22/17 09:39 Dose:  40 mg











- Labs


Labs: 


 





 10/21/17 06:07 





 10/21/17 06:07 





 











PT  13.1 SECONDS (9.7-12.2)  H  10/19/17  06:45    


 


INR  1.2   10/19/17  06:45    


 


APTT  26 SECONDS (21-34)   10/19/17  06:45    














- Cardiovascular Exam


Cardiovascular Exam: Irregular Rhythm





- GI/Abdominal Exam


GI & Abdominal Exam: Soft





Assessment and Plan





- Assessment and Plan (Free Text)


Assessment: 





chronic a,fib.


no anticoagulation.

## 2017-10-23 VITALS — RESPIRATION RATE: 20 BRPM

## 2017-10-23 LAB
ALBUMIN/GLOB SERPL: 1.2 {RATIO} (ref 1–2.1)
ALP SERPL-CCNC: 50 U/L (ref 38–126)
ALT SERPL-CCNC: 51 U/L (ref 21–72)
AST SERPL-CCNC: 30 U/L (ref 17–59)
BILIRUB SERPL-MCNC: 0.6 MG/DL (ref 0.2–1.3)
BUN SERPL-MCNC: 11 MG/DL (ref 9–20)
CALCIUM SERPL-MCNC: 7.6 MG/DL (ref 8.6–10.4)
CHLORIDE SERPL-SCNC: 105 MMOL/L (ref 98–107)
CO2 SERPL-SCNC: 26 MMOL/L (ref 22–30)
ERYTHROCYTE [DISTWIDTH] IN BLOOD BY AUTOMATED COUNT: 16.9 % (ref 11.5–14.5)
GLOBULIN SER-MCNC: 2.2 GM/DL (ref 2.2–3.9)
GLUCOSE SERPL-MCNC: 75 MG/DL (ref 75–110)
HCT VFR BLD CALC: 28.2 % (ref 35–51)
INR PPP: 1.2
MCH RBC QN AUTO: 30.5 PG (ref 27–31)
MCHC RBC AUTO-ENTMCNC: 33.1 G/DL (ref 33–37)
MCV RBC AUTO: 92.2 FL (ref 80–94)
PLATELET # BLD: 146 K/UL (ref 130–400)
PMV BLD AUTO: 12.5 FL (ref 7.2–11.7)
POTASSIUM SERPL-SCNC: 3.2 MMOL/L (ref 3.6–5.2)
PROT SERPL-MCNC: 4.9 G/DL (ref 6.3–8.3)
SODIUM SERPL-SCNC: 139 MMOL/L (ref 132–148)
WBC # BLD AUTO: 4.8 K/UL (ref 4.8–10.8)

## 2017-10-23 PROCEDURE — 0D5K8ZZ DESTRUCTION OF ASCENDING COLON, VIA NATURAL OR ARTIFICIAL OPENING ENDOSCOPIC: ICD-10-PCS | Performed by: INTERNAL MEDICINE

## 2017-10-23 PROCEDURE — 0DBL8ZX EXCISION OF TRANSVERSE COLON, VIA NATURAL OR ARTIFICIAL OPENING ENDOSCOPIC, DIAGNOSTIC: ICD-10-PCS | Performed by: INTERNAL MEDICINE

## 2017-10-23 RX ADMIN — CEFTRIAXONE SCH MLS/HR: 1 INJECTION, SOLUTION INTRAVENOUS at 09:15

## 2017-10-23 RX ADMIN — OMEGA-3-ACID ETHYL ESTERS SCH GM: 900 CAPSULE, LIQUID FILLED ORAL at 18:31

## 2017-10-23 RX ADMIN — DEXTROSE AND SODIUM CHLORIDE SCH MLS/HR: 5; 900 INJECTION, SOLUTION INTRAVENOUS at 17:00

## 2017-10-23 RX ADMIN — PANTOPRAZOLE SODIUM SCH MG: 40 TABLET, DELAYED RELEASE ORAL at 10:49

## 2017-10-23 RX ADMIN — CEFTRIAXONE SCH MLS/HR: 1 INJECTION, SOLUTION INTRAVENOUS at 21:10

## 2017-10-23 RX ADMIN — OMEGA-3-ACID ETHYL ESTERS SCH GM: 900 CAPSULE, LIQUID FILLED ORAL at 10:55

## 2017-10-23 RX ADMIN — DIVALPROEX SODIUM SCH: 125 TABLET, DELAYED RELEASE ORAL at 14:00

## 2017-10-23 RX ADMIN — DIVALPROEX SODIUM SCH MG: 125 TABLET, DELAYED RELEASE ORAL at 10:54

## 2017-10-23 RX ADMIN — DEXTROSE AND SODIUM CHLORIDE SCH: 5; 900 INJECTION, SOLUTION INTRAVENOUS at 03:25

## 2017-10-23 RX ADMIN — DIVALPROEX SODIUM SCH MG: 125 TABLET, DELAYED RELEASE ORAL at 18:31

## 2017-10-23 NOTE — CP.PCM.PN
<Iban Valdez R - Last Filed: 10/23/17 15:36>





Subjective





- Date & Time of Evaluation


Date of Evaluation: 10/23/17


Time of Evaluation: 08:07





- Subjective


Subjective: 


PGY-1 note for medicine, Dr Castellanos





Per nursing note, overnight patient had dark, liquid stool after enema x2. He 

was seen and examined this AM. He did not have any complaints. He was aware of 

the scheduled colonoscopy at 1pm. He denied all prompts on review of systems. 








Objective





- Vital Signs/Intake and Output


Vital Signs (last 24 hours): 


 











Temp Pulse Resp BP Pulse Ox


 


 98.1 F   82   20   116/74   97 


 


 10/23/17 06:44  10/23/17 06:44  10/23/17 06:44  10/23/17 06:44  10/23/17 06:44











- Medications


Medications: 


 Current Medications





Aripiprazole (Abilify)  1 mg PO DAILY [facility]


   Last Admin: 10/22/17 09:24 Dose:  1 mg


Divalproex Sodium (Depakote Dr Tab)  125 mg PO TID [facility]


   Last Admin: 10/22/17 17:54 Dose:  125 mg


Escitalopram Oxalate (Lexapro)  10 mg PO DAILY [facility]


   Last Admin: 10/22/17 09:23 Dose:  10 mg


Ceftriaxone Sodium (Rocephin Iv 1 Gm Duplex)  50 mls @ 100 mls/hr IVPB Q12H [facility]


   Last Admin: 10/22/17 21:34 Dose:  100 mls/hr


Azithromycin 500 mg/ Sodium (Chloride)  250 mls @ 250 mls/hr IVPB DAILY [facility]


   Last Admin: 10/22/17 09:40 Dose:  250 mls/hr


Dextrose/Sodium Chloride (Dextrose 5%/0.9% Ns 1000 Ml)  1,000 mls @ 70 mls/hr 

IV .[id number] [facility]


   Last Admin: 10/23/17 03:25 Dose:  Not Given


Nicotine (Nicoderm Cq)  1 patch TD DAILY [facility]


   Last Admin: 10/22/17 09:24 Dose:  1 patch


Omega-3-Acid Ethyl Esters (Lovaza)  1 gm PO BID [facility]


   Last Admin: 10/22/17 17:54 Dose:  1 gm


Pantoprazole Sodium (Protonix Ec Tab)  40 mg PO DAILY [facility]


   Last Admin: 10/22/17 09:39 Dose:  40 mg











- Labs


Labs: 


 





 10/23/17 07:16 





 10/21/17 06:07 





 











PT  14.0 SECONDS (9.7-12.2)  H  10/23/17  07:16    


 


INR  1.2   10/23/17  07:16    


 


APTT  26 SECONDS (21-34)   10/19/17  06:45    














- Constitutional


Appears: Well, No Acute Distress





- Head Exam


Head Exam: ATRAUMATIC, NORMAL INSPECTION





- Eye Exam


Eye Exam: EOMI





- ENT Exam


ENT Exam: Mucous Membranes Moist





- Neck Exam


Neck Exam: Full ROM, Normal Inspection





- Respiratory Exam


Respiratory Exam: Clear to Ausculation Bilateral, NORMAL BREATHING PATTERN.  

absent: Rales, Rhonchi, Wheezes





- Cardiovascular Exam


Cardiovascular Exam: REGULAR RHYTHM, RRR, +S1, +S2.  absent: Bradycardia, 

Tachycardia, Murmur





- GI/Abdominal Exam


GI & Abdominal Exam: Soft, Normal Bowel Sounds.  absent: Distended, Firm, 

Guarding, Rigid, Tenderness





- Extremities Exam


Extremities Exam: Normal Capillary Refill, Normal Inspection.  absent: Calf 

Tenderness





- Neurological Exam


Neurological Exam: Alert, Awake, Oriented x3





- Psychiatric Exam


Psychiatric exam: Normal Affect, Normal Mood





- Skin


Skin Exam: Dry, Intact, Normal Color, Warm





Assessment and Plan





- Assessment and Plan (Free Text)


Assessment: 


Anemia due to GI blood loss


Hgb at admission 7.3, Likely secondary to GI bleed


GI consulted- Dr. Sandhu


colonoscopy scheduled for today 10/23/17


Stool Occult positive


Transfused 1 unit PRBC (10/18)


Transfused 1 unit of pRBC (10/19)


s/p EGD 10/20/17 - Erosive gastritis and duodenitis. No active bleeding. No 

ulcers.


Protonix 40mg PO daily


HOLD Eliquis





Abdominal pain 


Patient c/o midline suprapubic/abdominal pain, described as sharp with 

palpation and worsens with constipation.


Abdomen/Pelvis CT with PO/IV contrast: No evidence of acute pathology in the 

abdomen and pelvis. No significant interval change in the right lower lung 

compared to the previous exam as described above


Stool occult positive





Atrial fibrillation


Rate controlled, irregularly irregular 


Cardiology consulted- Dr. Monroy


HOLD Home medication: Eliquis 5mg PO BID due to anemia


Monitor on telemetry


EKG: Atrial fibrillation @90bpm


Echo: LVF normal, EF normal, trace to mild aortic regurgitation and mitral 

regurgitation


HAS-BLED score of 3 - 4.1% - High Risk





Shortness of breath, questionable pneumonia


Patient uses 2 pillows and elevates the back of bed at home to decrease 

symptoms of SOB.


CXR: Increased right basilar airspace disease with minimal left airspace 

disease unchanged at the medial base. Small right pleural effusion identified 

increase. suggestive of infiltrates on report.


O2 2L via nasal cannula prn  


azithromycin 500mg ivpb qd started 10/21/17


ceftriaxone 1g ivpb q12h started 10/21/17





Constipation, resolved


Continue home medications PRN:


* Dulcolax 10mg PO HS 





History of tobacco abuse


Continue home medication: Nicoderm patch 





Hyperlipidemia


Continue home medication: omega-3 1 capsule PO BID





History of bipolar disorder 


Continue home medications for bipolar disorder & depression


* Abilify 1mg daily


* Depakote 125mg PO TID


* Lexapro 10mg PO daily 





Prophylactic measure


SCDs


Protonix 40mg PO qd


HOLD Eliquis due to GI bleed


Keep NPO


Fall precaution


PT/OT


Fluids: dextrose/NS at 70 cc/hr








<Trevor Castellanos - Last Filed: 10/23/17 16:15>





Objective





- Vital Signs/Intake and Output


Vital Signs (last 24 hours): 


 











Temp Pulse Resp BP Pulse Ox


 


 97.3 F L  94 H  25 H  101/53 L  100 


 


 10/23/17 14:15  10/23/17 14:15  10/23/17 14:15  10/23/17 14:15  10/23/17 14:15








Intake and Output: 


 











 10/23/17 10/23/17





 06:59 18:59


 


Intake Total  250


 


Balance  250














- Medications


Medications: 


 Current Medications





Aripiprazole (Abilify)  1 mg PO DAILY [facility]


   Last Admin: 10/23/17 10:54 Dose:  1 mg


Divalproex Sodium (Depakote Dr Tab)  125 mg PO TID [facility]


   Last Admin: 10/23/17 10:54 Dose:  125 mg


Escitalopram Oxalate (Lexapro)  10 mg PO DAILY [facility]


   Last Admin: 10/23/17 10:55 Dose:  10 mg


Ceftriaxone Sodium (Rocephin Iv 1 Gm Duplex)  50 mls @ 100 mls/hr IVPB Q12H [facility]


   Last Admin: 10/23/17 09:15 Dose:  100 mls/hr


Azithromycin 500 mg/ Sodium (Chloride)  250 mls @ 250 mls/hr IVPB DAILY [facility]


   Last Admin: 10/23/17 11:19 Dose:  250 mls/hr


Dextrose/Sodium Chloride (Dextrose 5%/0.9% Ns 1000 Ml)  1,000 mls @ 70 mls/hr 

IV .[id number] [facility]


   Last Admin: 10/23/17 03:25 Dose:  Not Given


Nicotine (Nicoderm Cq)  1 patch TD DAILY [facility]


   Last Admin: 10/23/17 10:49 Dose:  1 patch


Omega-3-Acid Ethyl Esters (Lovaza)  1 gm PO BID [facility]


   Last Admin: 10/23/17 10:55 Dose:  1 gm


Pantoprazole Sodium (Protonix Ec Tab)  40 mg PO DAILY [facility]


   Last Admin: 10/23/17 10:49 Dose:  40 mg











- Labs


Labs: 


 





 10/23/17 07:16 





 10/23/17 07:16 





 











PT  14.0 SECONDS (9.7-12.2)  H  10/23/17  07:16    


 


INR  1.2   10/23/17  07:16    


 


APTT  26 SECONDS (21-34)   10/19/17  06:45    














Attending/Attestation





- Attestation


I have personally seen and examined this patient.: Yes


I have fully participated in the care of the patient.: Yes


I have reviewed all pertinent clinical information, including history, physical 

exam and plan: Yes


Notes (Text): 








This is a 77years old male with history of afib on eliquis,HTN,CAD,HLD and CHF 

brought in for drop in hemoglobin and dark stool.He had transfusion.s/p EGD 

.Eliquis on hold





1.GI blood loss


 he has had two units of PRBCs. S/P EGD  erosive gastritis and duodentitis 

without acute bleeding or areas of recent bleeding. Patient's anticoagulation 

on hold. follow colonoscopy.


Likely secondary to GI bleed


Hgb at admission 7.3


Stool Occult positive


HOLD Eliquis


Protonix 40mg PO daily


Patient has high chads score but with rafiq and blood loss, has risk of 

bleeding We will d.w GI,follow colonoscopy





2.Abdominal pain-better





3.Afib-rate controlled.Eliquis on hold





4 Pneumonia


 Continue on IV rocephin and azithromycin,


5.Cosntipation


6.HLD


7.tobacco use


8.Bipolar


9GI and dvt prophylaxis





Patient was seen and examined with the resident.Agrees with the residents 

assesment and documentation








10/23/17 16:14

## 2017-10-24 VITALS — TEMPERATURE: 99 F | HEART RATE: 97 BPM | DIASTOLIC BLOOD PRESSURE: 81 MMHG | SYSTOLIC BLOOD PRESSURE: 123 MMHG

## 2017-10-24 VITALS — OXYGEN SATURATION: 97 %

## 2017-10-24 LAB
ALBUMIN/GLOB SERPL: 0.8 {RATIO} (ref 1–2.1)
ALP SERPL-CCNC: 47 U/L (ref 38–126)
ALT SERPL-CCNC: 49 U/L (ref 21–72)
AST SERPL-CCNC: 26 U/L (ref 17–59)
BASOPHILS # BLD AUTO: 0 K/UL (ref 0–0.2)
BASOPHILS NFR BLD: 1.1 % (ref 0–2)
BILIRUB SERPL-MCNC: 0.5 MG/DL (ref 0.2–1.3)
BUN SERPL-MCNC: 6 MG/DL (ref 9–20)
CALCIUM SERPL-MCNC: 7.8 MG/DL (ref 8.6–10.4)
CHLORIDE SERPL-SCNC: 104 MMOL/L (ref 98–107)
CO2 SERPL-SCNC: 26 MMOL/L (ref 22–30)
EOSINOPHIL # BLD AUTO: 0.3 K/UL (ref 0–0.7)
EOSINOPHIL NFR BLD: 7.9 % (ref 0–4)
ERYTHROCYTE [DISTWIDTH] IN BLOOD BY AUTOMATED COUNT: 16.3 % (ref 11.5–14.5)
GLOBULIN SER-MCNC: 2.9 GM/DL (ref 2.2–3.9)
GLUCOSE SERPL-MCNC: 73 MG/DL (ref 75–110)
HCT VFR BLD CALC: 27 % (ref 35–51)
LYMPHOCYTES # BLD AUTO: 0.8 K/UL (ref 1–4.3)
LYMPHOCYTES NFR BLD AUTO: 17.2 % (ref 20–40)
MCH RBC QN AUTO: 30.2 PG (ref 27–31)
MCHC RBC AUTO-ENTMCNC: 33 G/DL (ref 33–37)
MCV RBC AUTO: 91.3 FL (ref 80–94)
MONOCYTES # BLD: 0.4 K/UL (ref 0–0.8)
MONOCYTES NFR BLD: 9.4 % (ref 0–10)
NRBC BLD AUTO-RTO: 0.1 % (ref 0–2)
PLATELET # BLD: 130 K/UL (ref 130–400)
PMV BLD AUTO: 12.6 FL (ref 7.2–11.7)
POTASSIUM SERPL-SCNC: 3.3 MMOL/L (ref 3.6–5.2)
PROT SERPL-MCNC: 5.4 G/DL (ref 6.3–8.3)
SODIUM SERPL-SCNC: 137 MMOL/L (ref 132–148)
WBC # BLD AUTO: 4.4 K/UL (ref 4.8–10.8)

## 2017-10-24 RX ADMIN — OMEGA-3-ACID ETHYL ESTERS SCH GM: 900 CAPSULE, LIQUID FILLED ORAL at 09:42

## 2017-10-24 RX ADMIN — CEFTRIAXONE SCH MLS/HR: 1 INJECTION, SOLUTION INTRAVENOUS at 09:43

## 2017-10-24 RX ADMIN — PANTOPRAZOLE SODIUM SCH MG: 40 TABLET, DELAYED RELEASE ORAL at 09:51

## 2017-10-24 RX ADMIN — OMEGA-3-ACID ETHYL ESTERS SCH GM: 900 CAPSULE, LIQUID FILLED ORAL at 18:21

## 2017-10-24 RX ADMIN — DIVALPROEX SODIUM SCH MG: 125 TABLET, DELAYED RELEASE ORAL at 14:45

## 2017-10-24 RX ADMIN — DIVALPROEX SODIUM SCH MG: 125 TABLET, DELAYED RELEASE ORAL at 18:20

## 2017-10-24 RX ADMIN — DEXTROSE AND SODIUM CHLORIDE SCH MLS/HR: 5; 900 INJECTION, SOLUTION INTRAVENOUS at 09:11

## 2017-10-24 RX ADMIN — DIVALPROEX SODIUM SCH MG: 125 TABLET, DELAYED RELEASE ORAL at 09:42

## 2017-10-24 NOTE — CP.PCM.PN
<Radha Douglas - Last Filed: 10/24/17 09:01>





Subjective





- Date & Time of Evaluation


Date of Evaluation: 10/24/17


Time of Evaluation: 08:45





- Subjective


Subjective: 


Gastroenterology Fellow/PGY5 Progress Note





Patient resting comfortably in bed. Tolerating clear liquid diet. No bowel 

movement over night. Nursing denies acute events overnight. A 12-point review 

of systems negative except for as above.








Objective





- Vital Signs/Intake and Output


Vital Signs (last 24 hours): 


 











Temp Pulse Resp BP Pulse Ox


 


 98.2 F   63   20   131/78   97 


 


 10/24/17 08:10  10/24/17 08:10  10/24/17 08:10  10/24/17 08:10  10/24/17 08:10








Intake and Output: 


 











 10/24/17 10/24/17





 06:59 18:59


 


Intake Total 560 


 


Output Total 600 


 


Balance -40 














- Medications


Medications: 


 Current Medications





Aripiprazole (Abilify)  1 mg PO DAILY [facility]


   Last Admin: 10/23/17 10:54 Dose:  1 mg


Divalproex Sodium (Depakote Dr Tab)  125 mg PO TID [facility]


   Last Admin: 10/23/17 18:31 Dose:  125 mg


Escitalopram Oxalate (Lexapro)  10 mg PO DAILY [facility]


   Last Admin: 10/23/17 10:55 Dose:  10 mg


Ceftriaxone Sodium (Rocephin Iv 1 Gm Duplex)  50 mls @ 100 mls/hr IVPB Q12H [facility]


   Last Admin: 10/23/17 21:10 Dose:  100 mls/hr


Azithromycin 500 mg/ Sodium (Chloride)  250 mls @ 250 mls/hr IVPB DAILY [facility]


   Last Admin: 10/23/17 11:19 Dose:  250 mls/hr


Dextrose/Sodium Chloride (Dextrose 5%/0.9% Ns 1000 Ml)  1,000 mls @ 70 mls/hr 

IV .[id number] [facility]


   Last Admin: 10/23/17 17:00 Dose:  70 mls/hr


Nicotine (Nicoderm Cq)  1 patch TD DAILY [facility]


   Last Admin: 10/23/17 10:49 Dose:  1 patch


Omega-3-Acid Ethyl Esters (Lovaza)  1 gm PO BID [facility]


   Last Admin: 10/23/17 18:31 Dose:  1 gm


Pantoprazole Sodium (Protonix Ec Tab)  40 mg PO DAILY [facility]


   Last Admin: 10/23/17 10:49 Dose:  40 mg











- Labs


Labs: 


 





 10/24/17 06:08 





 10/24/17 06:08 





 











PT  14.0 SECONDS (9.7-12.2)  H  10/23/17  07:16    


 


INR  1.2   10/23/17  07:16    


 


APTT  26 SECONDS (21-34)   10/19/17  06:45    














- Constitutional


Appears: Non-toxic, No Acute Distress





- Head Exam


Head Exam: ATRAUMATIC, NORMOCEPHALIC





- Eye Exam


Eye Exam: EOMI, PERRL


Pupil Exam: PERRL.  absent: Miosis, Mydriatic





- ENT Exam


ENT Exam: Mucous Membranes Moist, Normal Oropharynx





- Neck Exam


Neck Exam: Full ROM, Normal Inspection





- Respiratory Exam


Respiratory Exam: Clear to Ausculation Bilateral.  absent: Rales, Rhonchi, 

Wheezes





- Cardiovascular Exam


Cardiovascular Exam: RRR, +S1, +S2.  absent: Gallop, Rubs





- GI/Abdominal Exam


GI & Abdominal Exam: Soft, Normal Bowel Sounds.  absent: Distended, Firm, 

Guarding, Rigid, Tenderness, Organomegaly, Rebound





- Extremities Exam


Extremities Exam: Normal Inspection, Pedal Edema





- Neurological Exam


Neurological Exam: Alert, Awake





- Psychiatric Exam


Psychiatric exam: Normal Affect, Normal Mood





- Skin


Skin Exam: Dry, Intact, Normal Color, Warm





Assessment and Plan





- Assessment and Plan (Free Text)


Assessment: 


77 year old male with history of Atrial fibrillation on Eliquis, Hypertension, 

Hyperlipidemia, CAD, Diabetes, Depression, BiPolar disorder, osteoporosis, and 

constipation presenting from nursing facility due to black stools and anemia. 

Active treatment of anemia 2/2 upper GI bleed POD1 (10/23) colonoscopy showing 

1 AVM s/p APC and poor prep due to retained hematin material from recent GI 

bleed.  Prior colonoscopy March 2017 showed poor prep and incomplete exam to 

sigmoid colon.














Plan: 





>no acitve GI blood loss


>H/H stable


>okay to resume Eliquis


>advance  diet as tolerated 


>has established appt with Dr Sandhu on 10/30/17


>will plan for repeat outpatient colonoscopy given poor prep


>okay to discharge from GI standpoint


>thank you for opportunity to participate in the care of this patient. Please 

contact with questions or concerns.





<Neil Sandhu - Last Filed: 10/24/17 11:52>





Objective





- Vital Signs/Intake and Output


Vital Signs (last 24 hours): 


 











Temp Pulse Resp BP Pulse Ox


 


 98.2 F   63   20   131/78   97 


 


 10/24/17 08:10  10/24/17 08:10  10/24/17 08:10  10/24/17 08:10  10/24/17 08:10








Intake and Output: 


 











 10/24/17 10/24/17





 06:59 18:59


 


Intake Total 560 


 


Output Total 600 


 


Balance -40 














- Medications


Medications: 


 Current Medications





Aripiprazole (Abilify)  1 mg PO DAILY [facility]


   Last Admin: 10/24/17 10:26 Dose:  1 mg


Divalproex Sodium (Depakote Dr Tab)  125 mg PO TID [facility]


   Last Admin: 10/24/17 09:42 Dose:  125 mg


Escitalopram Oxalate (Lexapro)  10 mg PO DAILY [facility]


   Last Admin: 10/24/17 09:43 Dose:  10 mg


Ceftriaxone Sodium (Rocephin Iv 1 Gm Duplex)  50 mls @ 100 mls/hr IVPB Q12H [facility]


   Last Admin: 10/24/17 09:43 Dose:  100 mls/hr


Azithromycin 500 mg/ Sodium (Chloride)  250 mls @ 250 mls/hr IVPB DAILY [facility]


   Last Admin: 10/24/17 10:30 Dose:  250 mls/hr


Nicotine (Nicoderm Cq)  1 patch TD DAILY [facility]


   Last Admin: 10/24/17 09:42 Dose:  1 patch


Omega-3-Acid Ethyl Esters (Lovaza)  1 gm PO BID [facility]


   Last Admin: 10/24/17 09:42 Dose:  1 gm


Pantoprazole Sodium (Protonix Ec Tab)  40 mg PO DAILY [facility]


   Last Admin: 10/24/17 09:51 Dose:  40 mg











- Labs


Labs: 


 





 10/24/17 06:08 





 10/24/17 06:08 





 











PT  14.0 SECONDS (9.7-12.2)  H  10/23/17  07:16    


 


INR  1.2   10/23/17  07:16    


 


APTT  26 SECONDS (21-34)   10/19/17  06:45    














Attending/Attestation





- Attestation


I have personally seen and examined this patient.: Yes


I have fully participated in the care of the patient.: Yes


I have reviewed all pertinent clinical information, including history, physical 

exam and plan: Yes


Notes (Text): 





10/24/17 11:49


I have seen and examined patient with GI fellow.  No acute events overnight, he 

is seen resting in bed comfortably.  No bowel movements over past 24 hours and 

he denies abdominal pain, nausea, vomiting, fever/chills.  Tolerating PO 

liquids without difficulty, asking for his diet to be advanced.  Review of 

vitals from today are normal.





Atrial fibrillation on eliquis


HTN


Hyperlipidemia


CAD


DM


Anemia - s/p EGD and colonoscopy showing colonic AVM s/p therapy along with 

suboptimal bowel preparation





- Advance diet as tolerated


- H/H stable, no overt bleeding noted, continue to monitor


- No specific GI contraindication to resuming anti-coagulant therapy for 

treatment of atrial fibrillation


- Patient would ideally require repeat colonoscopy with more optimal bowel 

preparation within 3 months in order to ensure absence of additional AVMs, etc


- No ongoing GI issues, will sign off case.  Patient has appointment with me in 

office next week.  Please reconsult as necessary, thank you.

## 2017-12-14 ENCOUNTER — HOSPITAL ENCOUNTER (OUTPATIENT)
Dept: HOSPITAL 31 - C.ENDO | Age: 77
Discharge: HOME | End: 2017-12-14
Attending: INTERNAL MEDICINE
Payer: MEDICARE

## 2017-12-14 VITALS — DIASTOLIC BLOOD PRESSURE: 77 MMHG | SYSTOLIC BLOOD PRESSURE: 132 MMHG | HEART RATE: 87 BPM

## 2017-12-14 VITALS — OXYGEN SATURATION: 100 % | TEMPERATURE: 98.7 F

## 2017-12-14 VITALS — RESPIRATION RATE: 19 BRPM

## 2017-12-14 VITALS — BODY MASS INDEX: 29.5 KG/M2

## 2017-12-14 DIAGNOSIS — K64.8: Primary | ICD-10-CM

## 2017-12-14 DIAGNOSIS — D64.9: ICD-10-CM

## 2017-12-14 DIAGNOSIS — K92.1: ICD-10-CM

## 2017-12-14 PROCEDURE — 88305 TISSUE EXAM BY PATHOLOGIST: CPT

## 2017-12-14 PROCEDURE — 45380 COLONOSCOPY AND BIOPSY: CPT

## 2017-12-14 NOTE — CP.SDSHP
[facility]





- History


Proposed Procedure: colonoscopy


Pre-Op Diagnosis: anemia, blood in stool





- Previous Medical/Surgical History


Cardiac: Hypertension, Arrhythmia


Endocrine/Metabolic: Obesity





- Allergies


Allergies: 


Allergies





No Known Allergies Allergy (Verified 03/22/17 09:25)


 











- Physical Exam


General Appearance: NAD


Vital Signs: 


 Vital Signs











  12/14/17





  07:08


 


Temperature 97.8 F


 


Pulse Rate 80


 


Respiratory 20





Rate 


 


Blood Pressure 117/75


 


O2 Sat by Pulse 99





Oximetry 











Mental Status: Alert & Oriented x3


Neuro: WNL


Heart: WNL


Lungs: WNL


GI: WNL





- {Optional Preform as Required}


Abdomen: WNL





- Impression


Pt. Evaluated Today:Candidate for Anesthesia & Procedure: Yes





- Date & Time


Date: 12/14/17


Time: 08:11





Short Stay Discharge





- Short Stay Discharge


Admitting Diagnosis/Reason for Visit: BLOOD IN STOOL


Disposition: HOME/ ROUTINE

## 2019-02-08 ENCOUNTER — HOSPITAL ENCOUNTER (EMERGENCY)
Dept: HOSPITAL 14 - H.ER | Age: 79
LOS: 1 days | Discharge: HOME | End: 2019-02-09
Payer: MEDICARE

## 2019-02-08 VITALS — BODY MASS INDEX: 29.5 KG/M2

## 2019-02-08 VITALS — OXYGEN SATURATION: 99 %

## 2019-02-08 DIAGNOSIS — M81.0: ICD-10-CM

## 2019-02-08 DIAGNOSIS — R60.0: Primary | ICD-10-CM

## 2019-02-08 DIAGNOSIS — I11.0: ICD-10-CM

## 2019-02-08 DIAGNOSIS — J44.9: ICD-10-CM

## 2019-02-08 DIAGNOSIS — Z79.899: ICD-10-CM

## 2019-02-08 DIAGNOSIS — F31.9: ICD-10-CM

## 2019-02-08 DIAGNOSIS — E78.00: ICD-10-CM

## 2019-02-08 DIAGNOSIS — I48.91: ICD-10-CM

## 2019-02-08 DIAGNOSIS — Z79.01: ICD-10-CM

## 2019-02-08 DIAGNOSIS — F41.9: ICD-10-CM

## 2019-02-08 DIAGNOSIS — I25.10: ICD-10-CM

## 2019-02-08 LAB
APTT BLD: 38.2 SECONDS (ref 25.6–37.1)
BASOPHILS # BLD AUTO: 0.1 K/UL (ref 0–0.2)
BASOPHILS NFR BLD: 1.2 % (ref 0–2)
BNP SERPL-MCNC: 1180 PG/ML (ref 0–900)
BUN SERPL-MCNC: 18 MG/DL (ref 9–20)
CALCIUM SERPL-MCNC: 8.6 MG/DL (ref 8.4–10.2)
EOSINOPHIL # BLD AUTO: 0.1 K/UL (ref 0–0.7)
EOSINOPHIL NFR BLD: 1 % (ref 0–4)
ERYTHROCYTE [DISTWIDTH] IN BLOOD BY AUTOMATED COUNT: 14.3 % (ref 11.5–14.5)
GFR NON-AFRICAN AMERICAN: > 60
HGB BLD-MCNC: 12.3 G/DL (ref 12–18)
INR PPP: 1.6
LYMPHOCYTES # BLD AUTO: 0.8 K/UL (ref 1–4.3)
LYMPHOCYTES NFR BLD AUTO: 13.1 % (ref 20–40)
MCH RBC QN AUTO: 31.3 PG (ref 27–31)
MCHC RBC AUTO-ENTMCNC: 32.5 G/DL (ref 33–37)
MCV RBC AUTO: 96.4 FL (ref 80–94)
MONOCYTES # BLD: 0.7 K/UL (ref 0–0.8)
MONOCYTES NFR BLD: 11.3 % (ref 0–10)
NEUTROPHILS # BLD: 4.4 K/UL (ref 1.8–7)
NEUTROPHILS NFR BLD AUTO: 73.4 % (ref 50–75)
NRBC BLD AUTO-RTO: 0 % (ref 0–0)
PLATELET # BLD: 171 K/UL (ref 130–400)
PMV BLD AUTO: 11.2 FL (ref 7.2–11.7)
PROTHROMBIN TIME: 18 SECONDS (ref 9.8–13.1)
RBC # BLD AUTO: 3.94 MIL/UL (ref 4.4–5.9)
WBC # BLD AUTO: 6 K/UL (ref 4.8–10.8)

## 2019-02-08 NOTE — ED PDOC
Lower Extremity Pain/Injury


Time Seen by Provider: 02/08/19 15:14


Chief Complaint (Nursing): Lower Extremity Problem/Injury


Chief Complaint (Provider): Leg swelling and pain


History Per: Patient


History/Exam Limitations: no limitations


Current Symptoms Are (Timing): Still Present


Additional Complaint(s): 





79 year old male, with a past medical history of atrial fibrillation and CHF, 

presents to the ED complaining of bilateral leg swelling, pain, and weakness.  

He states symptoms have been going on for a while.  Denies difficulty breathing.





PMD: Dr. Funes





Past Medical History


Reviewed: Historical Data, Nursing Documentation, Vital Signs


Vital Signs: 





                                Last Vital Signs











Temp  98.1 F   02/08/19 15:17


 


Pulse  114 H  02/08/19 15:17


 


Resp  18   02/08/19 15:17


 


BP  104/60   02/08/19 15:17


 


Pulse Ox  99   02/08/19 15:17














- Medical History


PMH: Anxiety, Atrial Fibrillation, Bipolar Disorder, CAD, Cardia Arrhythmia (A 

FIB), CHF, COPD, Depression, HTN, Hypercholesterolemia, Osteoporosis, Sleep 

Apnea


   Denies: Colonic Polyps, Fractures, Chronic Kidney Disease, TIA





- Surgical History


Surgical History: Endoscopy





- Family History


Family History: States: Unknown Family Hx





- Home Medications


Home Medications: 


                                Ambulatory Orders











 Medication  Instructions  Recorded


 


RX: Acetaminophen [Tylenol 325mg 650 mg PO Q4 PRN 10/18/17





tab]  


 


RX: Apixaban [Eliquis] 5 mg PO BID 10/18/17


 


RX: Bisacodyl [Dulcolax] 5 mg PO HS 10/18/17


 


RX: Divalproex [Depakote DR TAB] 125 mg PO Q8 10/18/17


 


RX: Lactulose 15 ml PO DAILY 10/18/17


 


RX: Lidocaine 5% 1 appl TP DAILY 10/18/17


 


RX: Magnesium Hydroxide [Milk Of 30 ml PO HS PRN 10/18/17





Magnesia]  


 


RX: Polyethylene Glycol 3350 17 gm PO HS 10/18/17





[Miralax]  


 


RX: Ranitidine HCl 150 mg PO HS 10/18/17


 


RX: Simethicone [Gas Relief 80] 2 tab PO TID 10/18/17


 


RX: oxyCODONE/Acetaminophen 1 tab PO Q12 PRN 10/18/17





[Percocet 5/325 mg Tab]  


 


Naloxegol Oxalate [Movantik] 25 mg PO DAILY 12/14/17


 


Acetaminophen [Tylenol 325mg tab] 650 mg PO Q4 PRN 02/08/19


 


Albuterol/Ipratropium [Duoneb 3 3 ml IH Q4 PRN 02/08/19





mg/0.5 mg (3 ml) UD]  


 


Calcium Carbonate [Oscal] 500 mg PO BID 02/08/19


 


Cholecalciferol [Vitamin D 1000 IU] 1,000 unit PO BID 02/08/19


 


OLANZapine [Zyprexa] 2.5 mg PO Q12 02/08/19


 


Omega-3-Acid Ethyl Esters 1 GM 1,000 mg PO Q12 02/08/19





[Lovaza]  


 


RX: Hydrocortisone 1% Cream 1 appl TOP DAILY 02/08/19





[Cortizone 1% Cream]  


 


RX: Mirtazapine [Remeron] 7.5 mg PO HS 02/08/19


 


RX: Nitroglycerin 0.4 mg/hr 1 patch TD DAILY 02/08/19





[Nitro-Dur 0.4 mg/hr Patch]  


 


Sennosides [Senna] 8.6 mg PO BID 02/08/19


 


Silver Sulfadiazine 1% [Silvadene 1 appl TOP BID 02/08/19





1%]  


 


Zolpidem [Ambien] 10 mg PO HS 02/08/19














- Allergies


Allergies/Adverse Reactions: 


                                    Allergies











Allergy/AdvReac Type Severity Reaction Status Date / Time


 


No Known Allergies Allergy   Verified 02/08/19 15:07














Review of Systems


ROS Statement: Except As Marked, All Systems Reviewed And Found Negative


Respiratory: Negative for: Shortness of Breath


Musculoskeletal: Positive for: Leg Pain (bilateral), Other (Bilateral leg 

swelling and weakness)





Physical Exam





- Reviewed


Nursing Documentation Reviewed: Yes


Vital Signs Reviewed: Yes





- Physical Exam


Appears: Positive for: Non-toxic, No Acute Distress


Head Exam: Positive for: ATRAUMATIC, NORMOCEPHALIC


Skin: Positive for: Normal Color, Warm, Dry


Eye Exam: Positive for: Normal appearance


Neck: Positive for: Normal, Painless ROM


Cardiovascular/Chest: Positive for: Irregularly Irregular


Respiratory: Positive for: Normal Breath Sounds.  Negative for: Wheezing, 

Respiratory Distress


Extremity: Positive for: Swelling (Bilateral lower leg), Other (stasis 

dermatitis of bilateral lower leg)


Neurologic/Psych: Positive for: Alert, Oriented.  Negative for: Motor/Sensory 

Deficits





- Laboratory Results


Result Diagrams: 


                                 02/08/19 16:37





                                 02/08/19 16:37





- ECG


ECG Rhythm: Positive for: Normal QRS, Atrial Fibrillation.  Negative for: ST/T 

Changes


Interpretation Of ECG: 





RVR


Rate: 107


O2 Sat by Pulse Oximetry: 99 (RA)


Pulse Ox Interpretation: Normal





Medical Decision Making


Medical Decision Making: 





Initial Impression: Leg swelling and pain


Differential includes CHF exacerbation, DVT, and dependent edema.





Initial Plan: 


--B-type natriuretic


--BMP


--CBC


--PTT


--Prothrombin time


--Lopressor 5mg IV


--US lower extremities





17:00


Patient endorsed to Dr. Baig.  Pending US.


 

--------------------------------------------------------------------------------


-----------------


Scribe Attestation:


Documented by Mulugeta Marvni acting as a scribe for Marlys Springer MD.





Provider Scribe Attestation:


All medical record entries made by the Scribe were at my direction and 

personally dictated by me. I have reviewed the chart and agree that the record 

accurately reflects my personal performance of the history, physical exam, 

medical decision making, and the department course for this patient. I have also

 personally directed, reviewed, and agree with the discharge instructions and 

disposition.





Disposition





- Clinical Impression


Clinical Impression: 


 Leg edema








- Patient ED Disposition


Is Patient to be Admitted: Transfer of Care





- Disposition


Referrals: 


Domingo Funes MD [Family Provider] - 02/09/19


Disposition: Routine/Home


Disposition Time: 16:00


Condition: STABLE


Instructions:  Dependent Edema (DC)


Patient Signed Over To: Mariposa Baig

## 2019-02-08 NOTE — US
Date of service: 



02/08/2019



PROCEDURE:  Bilateral lower extremity venous duplex Doppler. 



HISTORY:

leg swelling cirilo



COMPARISON:

None available. 



TECHNIQUE:

Bilateral common femoral, superficial femoral, popliteal and 

posterior tibial veins were evaluated. Flow was assessed with color 

Doppler, compressibility, assessment of phasic flow and augmentation 

response. 



FINDINGS:



COMMON FEMORAL VEIN:

Right CFV:  Unremarkable.



Left CFV:  Unremarkable.



SUPERFICIAL FEMORAL VEIN:

Right SFV: Unremarkable.



Left SFV:  Unremarkable.



POPLITEAL VEIN:

Right Popliteal:  Unremarkable.



Left Popliteal:  Unremarkable.



POSTERIOR TIBIAL VEIN:

Right PTV: Unremarkable.



Left PTV: Unremarkable.



OTHER FINDINGS:

None.



IMPRESSION:

No evidence of deep venous thrombosis in the right or left lower 

extremity.

## 2019-02-08 NOTE — ED PDOC
- Laboratory Results


Result Diagrams: 


                                 19 16:37





                                 19 16:37


Lab Results: 





                                        











PT  18.0 Seconds (9.8-13.1)  H  19  16:37    


 


INR  1.6   19  16:37    


 


APTT  38.2 Seconds (25.6-37.1)  H  19  16:37    








                                        











NT-Pro-B Natriuret Pep  1180 pg/ml (0-900)  H  19  16:37    














- ECG


O2 Sat by Pulse Oximetry: 99 (RA)





Medical Decision Making


Medical Decision Makin:00


Patient endorsed to this provider from Dr. Springer.  Pending ultrasound.





---------------------------------------





Accession No. : [id number]


Patient Name / ID : TA QUINN  / [id number]


Exam Date : 2019 18:27:42 ( Approved )


Study Comment : 


Sex / Age : M  / 79Y





Creator : Ephraim Simon MD


Dictator : Ephraim Simon MD


 : 


Approver : Ephraim Simon MD


Approver2 : 





Report Date : 2019 19:00:40


My Comment : 


 

********************************************************************************


***





Date of service: 





2019





PROCEDURE:  Bilateral lower extremity venous duplex Doppler. 





HISTORY:


leg swelling cirilo





COMPARISON:


None available. 





TECHNIQUE:


Bilateral common femoral, superficial femoral, popliteal and posterior tibial 

veins were evaluated. Flow was assessed with color Doppler, compressibility, 

assessment of phasic flow and augmentation response. 





FINDINGS:





COMMON FEMORAL VEIN:


Right CFV:  Unremarkable.





Left CFV:  Unremarkable.





SUPERFICIAL FEMORAL VEIN:


Right SFV: Unremarkable.





Left SFV:  Unremarkable.





POPLITEAL VEIN:


Right Popliteal:  Unremarkable.





Left Popliteal:  Unremarkable.





POSTERIOR TIBIAL VEIN:


Right PTV: Unremarkable.





Left PTV: Unremarkable.





OTHER FINDINGS:


None.





IMPRESSION:


No evidence of deep venous thrombosis in the right or left lower extremity.





------------------------------------------------------------











--------------------------------

-----------------------------------------------------------------


Scribe Attestation:


Documented by Mulugeta Marvin acting as a scribe for Mariposa Baig MD.





Provider Scribe Attestation:


All medical record entries made by the Scribe were at my direction and 

personally dictated by me. I have reviewed the chart and agree that the record 

accurately reflects my personal performance of the history, physical exam, 

medical decision making, and the department course for this patient. I have also

personally directed, reviewed, and agree with the discharge instructions and 

disposition.





Disposition





- Clinical Impression


Clinical Impression: 


 Leg edema








- POA


Present On Arrival: None





- Disposition


Referrals: 


Domingo Funes MD [Family Provider] - 19


Disposition: Other Institution


Disposition Time: 19:00


Condition: STABLE


Instructions:  Dependent Edema (DC)

## 2019-02-08 NOTE — RAD
Date of service: 



02/08/2019



HISTORY:

 CHF 



COMPARISON:

No prior. 



FINDINGS:



LUNGS:

Patient rotated toward the right limiting interpretation.  Linear 

atelectasis seen at the right lung base but no definite alveolitis 

bilaterally.  Inspiratory volume appears somewhat limited.



PLEURA:

No significant pleural effusion identified, no pneumothorax apparent.



CARDIOVASCULAR:

Calcific atherosclerotic changes are seen related to the thoracic 

aorta.



Normal cardiac size. No pulmonary vascular congestion. 



OSSEOUS STRUCTURES:

No significant abnormalities.



VISUALIZED UPPER ABDOMEN:

Normal.



OTHER FINDINGS:

None.



IMPRESSION:

Diminished inspiratory volume.  Rotation toward the right.  Linear 

atelectasis right base.  No definite alveolitis bilaterally.  No 

pleural effusion or definite pulmonary vascular congestion 

bilaterally.

## 2019-02-09 VITALS — TEMPERATURE: 98 F | DIASTOLIC BLOOD PRESSURE: 76 MMHG | RESPIRATION RATE: 16 BRPM | SYSTOLIC BLOOD PRESSURE: 145 MMHG

## 2019-02-09 NOTE — CARD
--------------- APPROVED REPORT --------------





Date of service: 02/08/2019



EKG Measurement

Heart [id number]

[id number]

[id number]

[id number]



<Conclusion>

Atrial fibrillation with rapid ventricular response

Abnormal ECG

## 2019-02-11 VITALS — HEART RATE: 107 BPM

## 2019-02-23 ENCOUNTER — HOSPITAL ENCOUNTER (EMERGENCY)
Dept: HOSPITAL 14 - H.ER | Age: 79
Discharge: HOME | End: 2019-02-23
Payer: MEDICARE

## 2019-02-23 VITALS
SYSTOLIC BLOOD PRESSURE: 122 MMHG | TEMPERATURE: 98 F | OXYGEN SATURATION: 99 % | DIASTOLIC BLOOD PRESSURE: 70 MMHG | HEART RATE: 77 BPM | RESPIRATION RATE: 17 BRPM

## 2019-02-23 VITALS — BODY MASS INDEX: 29.5 KG/M2

## 2019-02-23 DIAGNOSIS — I10: ICD-10-CM

## 2019-02-23 DIAGNOSIS — I50.9: ICD-10-CM

## 2019-02-23 DIAGNOSIS — Z86.59: ICD-10-CM

## 2019-02-23 DIAGNOSIS — N49.2: Primary | ICD-10-CM

## 2019-02-23 NOTE — ED PDOC
HPI: Male  Pain


Time Seen by Provider: 02/23/19 07:52


Chief Complaint (Nursing): Male Genitourinary


History Per: Other (patient is sent by NH because pain in the groin area. 

patient is not a good historian. )


History/Exam Limitations: clinical condition





Past Medical History


Reviewed: Historical Data, Nursing Documentation, Vital Signs


Vital Signs: 





                                Last Vital Signs











Temp  98.1 F   02/23/19 07:30


 


Pulse  80   02/23/19 07:30


 


Resp  20   02/23/19 07:30


 


BP  121/62   02/23/19 07:30


 


Pulse Ox  96   02/23/19 07:30














- Medical History


PMH: Anxiety, Atrial Fibrillation, Bipolar Disorder, CAD, Cardia Arrhythmia (A 

FIB), CHF, COPD, Depression, HTN, Hypercholesterolemia, Osteoporosis, Sleep 

Apnea


   Denies: Colonic Polyps, Fractures, Chronic Kidney Disease, TIA





- Surgical History


Surgical History: Endoscopy





- Family History


Family History: States: Unknown Family Hx





- Living Arrangements


Living Arrangements: Nursing Home/Assist Lvng





- Home Medications


Home Medications: 


                                Ambulatory Orders











 Medication  Instructions  Recorded


 


Acetaminophen [Tylenol 325mg tab] 650 mg PO Q4 PRN 10/18/17


 


Apixaban [Eliquis] 5 mg PO BID 10/18/17


 


Bisacodyl [Dulcolax] 5 mg PO HS 10/18/17


 


Divalproex [Depakote DR TAB] 125 mg PO Q8 10/18/17


 


Lactulose 15 ml PO DAILY 10/18/17


 


Lidocaine 5% 1 appl TP DAILY 10/18/17


 


Magnesium Hydroxide [Milk Of 30 ml PO HS PRN 10/18/17





Magnesia]  


 


Polyethylene Glycol 3350 [Miralax] 17 gm PO HS 10/18/17


 


Ranitidine HCl 150 mg PO HS 10/18/17


 


Simethicone [Gas Relief 80] 2 tab PO TID 10/18/17


 


oxyCODONE/Acetaminophen [Percocet 1 tab PO Q12 PRN 10/18/17





5/325 mg Tab]  


 


Naloxegol Oxalate [Movantik] 25 mg PO DAILY 12/14/17


 


Acetaminophen [Tylenol 325mg tab] 650 mg PO Q4 PRN 02/08/19


 


Albuterol/Ipratropium [Duoneb 3 3 ml IH Q4 PRN 02/08/19





mg/0.5 mg (3 ml) UD]  


 


Calcium Carbonate [Oscal] 500 mg PO BID 02/08/19


 


Cholecalciferol [Vitamin D 1000 IU] 1,000 unit PO BID 02/08/19


 


Hydrocortisone 1% Cream [Cortizone 1 appl TOP DAILY 02/08/19





1% Cream]  


 


Mirtazapine [Remeron] 7.5 mg PO HS 02/08/19


 


Nitroglycerin 0.4 mg/hr [Nitro-Dur 1 patch TD DAILY 02/08/19





0.4 mg/hr Patch]  


 


OLANZapine [Zyprexa] 2.5 mg PO Q12 02/08/19


 


Omega-3-Acid Ethyl Esters 1 GM 1,000 mg PO Q12 02/08/19





[Lovaza]  


 


Sennosides [Senna] 8.6 mg PO BID 02/08/19


 


Silver Sulfadiazine 1% [Silvadene 1 appl TOP BID 02/08/19





1%]  


 


Zolpidem [Ambien] 10 mg PO HS 02/08/19


 


Cephalexin [Keflex] 500 mg PO TID #21 capsule 02/23/19


 


Mupirocin 2% Oint UD [Bactroban 1 applic EXT BID #30 gm 02/23/19





Ointment]  














- Allergies


Allergies/Adverse Reactions: 


                                    Allergies











Allergy/AdvReac Type Severity Reaction Status Date / Time


 


No Known Allergies Allergy   Verified 02/08/19 15:07














Review of Systems


Review Of Systems: ROS cannot be obtained secondary to pt's inabilty to answer 

questions.





Physical Exam





- Reviewed


Nursing Documentation Reviewed: Yes


Vital Signs Reviewed: Yes





- Physical Exam


Appears: Positive for: Well


Skin: Positive for: Normal Color, Rash (abrasions in the right scrotum. mildly 

tender. no induration or discharge. normal temperature.)


Neck: Positive for: Normal, Painless ROM


Cardiovascular/Chest: Positive for: Regular Rate, Rhythm


Respiratory: Positive for: CNT, Normal Breath Sounds


Extremity: Positive for: Normal ROM





- ECG


O2 Sat by Pulse Oximetry: 96





Disposition





- Clinical Impression


Clinical Impression: 


 Scrotal infection








- Patient ED Disposition


Is Patient to be Admitted: No


Doctor Will See Patient In The: Office


Counseled Patient/Family Regarding: Diagnosis, Need For Followup, Rx Given





- Disposition


[facility]position[facility]


Disposition Time: 08:30


Condition: STABLE


Prescriptions: 


Cephalexin [Keflex] 500 mg PO TID #21 capsule


Mupirocin 2% Oint UD [Bactroban Ointment] 1 applic EXT BID #30 gm


Instructions:  Cellulitis and Erysipelas (Skin Infections)





- POA


Present On Arrival: None

## 2021-11-22 NOTE — CP.PCM.DIS
Provider





- Provider


Date of Admission: 


10/18/17 21:44





Attending physician: 


Dick Schmidt MD





Primary care physician: 


Dr Claros





Consults: 


Cardiology - Dr Eliana TRISTAN - Dr Sandhu





Time Spent in preparation of Discharge (in minutes): 45





Hospital Course





- Lab Results


Lab Results: 


 Most Recent Lab Values











WBC  4.4 K/uL (4.8-10.8)  L  10/24/17  06:08    


 


RBC  2.96 Mil/uL (4.40-5.90)  L  10/24/17  06:08    


 


Hgb  8.9 g/dL (12.0-18.0)  L  10/24/17  06:08    


 


Hct  27.0 % (35.0-51.0)  L  10/24/17  06:08    


 


MCV  91.3 fL (80.0-94.0)   10/24/17  06:08    


 


MCH  30.2 pg (27.0-31.0)   10/24/17  06:08    


 


MCHC  33.0 g/dL (33.0-37.0)   10/24/17  06:08    


 


RDW  16.3 % (11.5-14.5)  H  10/24/17  06:08    


 


Plt Count  130 K/uL (130-400)   10/24/17  06:08    


 


MPV  12.6 fL (7.2-11.7)  H  10/24/17  06:08    


 


Neut % (Auto)  64.4 % (50.0-75.0)   10/24/17  06:08    


 


Lymph % (Auto)  17.2 % (20.0-40.0)  L  10/24/17  06:08    


 


Mono % (Auto)  9.4 % (0.0-10.0)   10/24/17  06:08    


 


Eos % (Auto)  7.9 % (0.0-4.0)  H  10/24/17  06:08    


 


Baso % (Auto)  1.1 % (0.0-2.0)   10/24/17  06:08    


 


Neut #  2.8 K/uL (1.8-7.0)   10/24/17  06:08    


 


Lymph #  0.8 K/uL (1.0-4.3)  L  10/24/17  06:08    


 


Mono #  0.4 K/uL (0.0-0.8)   10/24/17  06:08    


 


Eos #  0.3 K/uL (0.0-0.7)   10/24/17  06:08    


 


Baso #  0.0 K/uL (0.0-0.2)   10/24/17  06:08    


 


Differential Comment     10/21/17  06:07    


 


PT  14.0 SECONDS (9.7-12.2)  H  10/23/17  07:16    


 


INR  1.2   10/23/17  07:16    


 


APTT  26 SECONDS (21-34)   10/19/17  06:45    


 


pO2  42 mm/Hg (30-55)   10/18/17  18:55    


 


VBG pH  7.43  (7.32-7.43)   10/18/17  18:55    


 


VBG pCO2  42 mmHg (40-60)   10/18/17  18:55    


 


VBG HCO3  26.9 mmol/L  10/18/17  18:55    


 


VBG Total CO2  29.2 mmol/L (22-28)  H  10/18/17  18:55    


 


VBG O2 Sat (Calc)  83.7 % (40-65)  H  10/18/17  18:55    


 


VBG Base Excess  3.2 mmol/L (0.0-2.0)  H  10/18/17  18:55    


 


VBG Potassium  5.9 mmol/L (3.6-5.2)  H  10/18/17  18:55    


 


Sodium  140.0 mmol/l (132-148)   10/18/17  18:55    


 


Chloride  113.0 mmol/L ()  H  10/18/17  18:55    


 


Glucose  98 mg/dl ()   10/18/17  18:55    


 


Lactate  2.6 mmol/L (0.7-2.1)  H  10/18/17  18:55    


 


Sodium  137 mmol/L (132-148)   10/24/17  06:08    


 


Potassium  3.3 mmol/L (3.6-5.2)  L  10/24/17  06:08    


 


Chloride  104 mmol/L ()   10/24/17  06:08    


 


Carbon Dioxide  26 mmol/L (22-30)   10/24/17  06:08    


 


Anion Gap  10  (10-20)   10/24/17  06:08    


 


BUN  6 mg/dL (9-20)  L  10/24/17  06:08    


 


Creatinine  0.7 mg/dL (0.8-1.5)  L  10/24/17  06:08    


 


Est GFR ( Amer)  > 60   10/24/17  06:08    


 


Est GFR (Non-Af Amer)  > 60   10/24/17  06:08    


 


POC Glucose (mg/dL)  84 mg/dL ()   10/24/17  16:37    


 


Random Glucose  73 mg/dL ()  L  10/24/17  06:08    


 


Hemoglobin A1c  5.2 % (4.2-6.5)   10/19/17  06:45    


 


Calcium  7.8 mg/dl (8.6-10.4)  L  10/24/17  06:08    


 


Total Bilirubin  0.5 mg/dL (0.2-1.3)   10/24/17  06:08    


 


AST  26 U/L (17-59)   10/24/17  06:08    


 


ALT  49 U/L (21-72)   10/24/17  06:08    


 


Alkaline Phosphatase  47 U/L ()   10/24/17  06:08    


 


Total Protein  5.4 g/dL (6.3-8.3)  L  10/24/17  06:08    


 


Albumin  2.4 g/dL (3.5-5.0)  L  10/24/17  06:08    


 


Globulin  2.9 gm/dL (2.2-3.9)   10/24/17  06:08    


 


Albumin/Globulin Ratio  0.8  (1.0-2.1)  L  10/24/17  06:08    


 


Venous Blood Potassium  5.9 mmol/L (3.6-5.2)  H  10/18/17  18:55    


 


Urine Color  Yellow  (YELLOW)   10/18/17  18:49    


 


Urine Clarity  Clear  (Clear)   10/18/17  18:49    


 


Urine pH  5.0  (5.0-8.0)   10/18/17  18:49    


 


Ur Specific Gravity  1.026  (1.003-1.030)   10/18/17  18:49    


 


Urine Protein  Negative mg/dL (NEGATIVE)   10/18/17  18:49    


 


Urine Glucose (UA)  Normal mg/dL (Normal)   10/18/17  18:49    


 


Urine Ketones  Negative mg/dL (NEGATIVE)   10/18/17  18:49    


 


Urine Blood  Negative  (NEGATIVE)   10/18/17  18:49    


 


Urine Nitrate  Negative  (NEGATIVE)   10/18/17  18:49    


 


Urine Bilirubin  Negative  (NEGATIVE)   10/18/17  18:49    


 


Urine Urobilinogen  2.0 mg/dL (0.2-1.0)   10/18/17  18:49    


 


Ur Leukocyte Esterase  Neg Frank/uL (Negative)   10/18/17  18:49    


 


Urine WBC (Auto)  3 /hpf (0-5)   10/18/17  18:49    


 


Urine RBC (Auto)  < 1 /hpf (0-3)   10/18/17  18:49    


 


Stool Occult Blood  Positive  (NEGATIVE)  H  10/18/17  19:43    


 


Stool H. pylori Ag  Not detected  (Not Detected)   10/22/17  21:22    


 


H. pylori Source  Stool   10/22/17  21:22    


 


Hepatitis A IgM Ab  Negative  (NEGATIVE)   10/19/17  06:45    


 


Hep Bs Antigen  Negative  (NEGATIVE)   10/19/17  06:45    


 


Hep B Core IgM Ab  Negative  (NEGATIVE)   10/19/17  06:45    


 


Hepatitis C Antibody  Negative  (NEGATIVE)   10/19/17  06:45    


 


Blood Type  A POSITIVE   10/18/17  18:49    


 


Blood Type Confirm  A POSITIVE   10/18/17  18:49    


 


Antibody Screen  Negative   10/18/17  18:49    














- Hospital Course


Hospital Course: 


Patient is a 77 years old male with a past medical history of a fib, HTN, CAD, 

CHF, HLD, who presents to the ED from [facility] with complaints of 

weakness and a low hemoglobin. Patient was sent in by Dr. Claros for a 

hemoglobin of 7.3, which dropped from a hgb of 9.2 two days prior. Patient 

reports he has been feeling weak and lethargic for approximately 1 week. He 

says he cannot walk because he feels so weak. Patient also reports feeling dizzy

, has shortness of breath that worsens when laying down, abdominal pain for the 

last 3-4 weeks that worsens with constipation, chronic constipation and black 

stool. He reports he becomes constipated for days, takes milk of magnesia, then 

has diarrhea, and continues that cycles. He has not had a bowel movement in 2 

days. Patient reports he was recently treated for bilateral leg swelling with 

water pills about 1 month ago, and since then has lost approximately 40lbs. 

Patient reports he has been bedridden for the past few weeks due to inability 

to walk. Currently, the patient denies having chest pain, palpitations, cough, 

nausea, vomiting, fevers, chills, headaches, and leg pain.





PMD: Dr. Claros


PMHx: patient denies, but as per EMR- a fib, HTN, CAD, CHF, HLD, depression 

bipolar disorder, osteoporisis, COPD


SurgHx: denies


FamHx: Father-eye cancer, Sisters- Breast cancer


SocHx: Former tobacco user, quit 1 month ago ([id number]); former social drinker

, denies drug use; LIves are [facility].


Allergies: NKDA


Medications: See EMR for extensive list.





HOSPITAL COURSE: Due to low hemoglobin, the patient was transferred 1 unit pRBC 

on 10/18/17 and 1 unit pRBC on 10/19/17. The patient was worked up for his GI 

bleed and abdominal pain. Gasteroenterology, Dr Sandhu, was consulted. A fecal 

stool occult was positive. An abdomen/pelvis CT with PO/IV contrast was taken 

which showed no evidence of acute pathology in the abdomen and pelvis. An EGD 

was done on 10/20/17 which showed erosive gastritis and duodenitis. No active 

bleeding. No ulcers. A colonoscopy was performed on 10/23/17 - a 5mm polyp in 

the transverse colon was resected. Angioectasia was found in ascending colon 

which was cauterized. Internal hemorrhoids were found along with hypertrophied 

anal papillae. Hematin was found in the entire colon. However, due to poor 

bowel prep, GI recommended a repeat colonoscopy within 3 months for better 

visualization. Cardiology, Dr Monroy, was consulted to treat his atrial 

fibrillatioin. He was rate controlled and monitored on  telemetry. An echo was 

performed which showed LVF normal, EF normal, trace to mild aortic 

regurgitation and mitral regurgitation. Due to the anemia, his home medication 

of Eliquis 5mg po bid was stopped. However, upon discharge, it was discussed 

with GI and per GI there is no specific contraindication to resume eliquis for 

treatment of atrial fibrillation. A Chest Xray taken was suggestive of 

infiltrates thus the patient was treated with azithromycin 500mg ivpb qd and 

ceftriaxone 1g ivpb q12h. He was discharged on zithromax 250mg po qd to be 

taken for 3 days. His chronic conditions were treated by resuming his home 

medications.











Discharge Exam





- Head Exam


Head Exam: ATRAUMATIC, NORMOCEPHALIC





- Eye Exam


Eye Exam: EOMI


Pupil Exam: PERRL





- ENT Exam


ENT Exam: Mucous Membranes Moist





- Neck Exam


Neck exam: Normal Inspection





- Respiratory Exam


Respiratory Exam: Clear to PA & Lateral, NORMAL BREATHING PATTERN.  absent: 

Rales, Rhonchi, Wheezes





- Cardiovascular Exam


Cardiovascular Exam: Irregular Rhythm, +S1, +S2.  absent: Bradycardia, 

Tachycardia





- GI/Abdominal Exam


GI & Abdominal Exam: Normal Bowel Sounds, Soft.  absent: Distended, Firm, Mass, 

Tenderness





- Extremities Exam


Additional comments: 


no pedal edema noted in upper or lower extremities








- Neurological Exam


Neurological exam: Alert, Oriented x3





- Psychiatric Exam


Psychiatric exam: Flat Affect





- Skin


Skin Exam: Dry, Intact, Normal Color, Warm





Discharge Plan





- Discharge Medications


Prescriptions: 


Azithromycin [Zithromax] 250 mg PO DAILY 3 Days #3 tab





- Follow Up Plan


Condition: FAIR


Disposition: NURSING FACILITY MEDICAID CERT


Instructions:  Heart Failure (DC), Atrial Fibrillation (DC), Gastrointestinal 

Bleeding (DC), Colonoscopy (DC), Heart Healthy Diet (DC), Upper Endoscopy (DC), 

Anemia (DC)


Additional Instructions: 


Patient is medically stable for discharge. 





In addition to your home medications, please take Zithromax 250mg 1 tablet by 

mouth once a day for 3 days.





Please follow up with Gasteroenterologist, Dr MELODY Sandhu, in his office, an 

appointment is scheduled for 10/30/17 (phone: [phone]). He will have 

further recommendations regarding a repeat colonoscopy. 





There is no specific GI contraindication to resuming anti-coagulant therapy for 

treatment of atrial fibrillation. Therefore, please take your Eliquis 5mg by 

mouth twice a day as previously prescribed. 





Please make an appointment with your primary care doctor, Dr Claros. A copy of 

your discharge summary will be faxed to him so he is aware of the treatments 

you received during this admission. Please resume your home medications in the 

meantime. 





Please return to the ED if symptoms return. 


Referrals: 


Neil Sandhu MD [Staff Provider] - 


Elton Claros MD [Staff Provider] -
None

## 2022-11-07 NOTE — RAD
HISTORY:

assess for fluid overload, CHF  



COMPARISON:

Portable chest 10/18/2017. 



FINDINGS:



LUNGS:

Limited airspace disease increased at the right base and stable at 

the medial left base.



PLEURA:

A small right pleural effusion is increased.  None is identified in 

the left. No pneumothorax bilaterally.



CARDIOVASCULAR:

Cardiomediastinal silhouette appears stable although overall 

inspiratory volume appears diminished in the interval. No pulmonary 

vascular derangement identified.



OSSEOUS STRUCTURES:

No significant abnormalities.



VISUALIZED UPPER ABDOMEN:

Normal.



OTHER FINDINGS:

None.



IMPRESSION:

Increased right basilar airspace disease with minimal left airspace 

disease unchanged at the medial base. Small right pleural effusion 

identified increase. Patient was informed of the reason for this intervention.